# Patient Record
Sex: MALE | Race: BLACK OR AFRICAN AMERICAN | NOT HISPANIC OR LATINO | URBAN - METROPOLITAN AREA
[De-identification: names, ages, dates, MRNs, and addresses within clinical notes are randomized per-mention and may not be internally consistent; named-entity substitution may affect disease eponyms.]

---

## 2024-01-17 ENCOUNTER — INPATIENT (INPATIENT)
Facility: HOSPITAL | Age: 59
LOS: 1 days | Discharge: HOME | DRG: 321 | End: 2024-01-19
Attending: STUDENT IN AN ORGANIZED HEALTH CARE EDUCATION/TRAINING PROGRAM | Admitting: INTERNAL MEDICINE
Payer: COMMERCIAL

## 2024-01-17 VITALS
HEART RATE: 110 BPM | DIASTOLIC BLOOD PRESSURE: 102 MMHG | OXYGEN SATURATION: 97 % | HEIGHT: 64 IN | RESPIRATION RATE: 18 BRPM | SYSTOLIC BLOOD PRESSURE: 163 MMHG | TEMPERATURE: 97 F | WEIGHT: 168.43 LBS

## 2024-01-17 DIAGNOSIS — I21.3 ST ELEVATION (STEMI) MYOCARDIAL INFARCTION OF UNSPECIFIED SITE: ICD-10-CM

## 2024-01-17 LAB
ALBUMIN SERPL ELPH-MCNC: 4.7 G/DL — SIGNIFICANT CHANGE UP (ref 3.3–5)
ALP SERPL-CCNC: 77 U/L — SIGNIFICANT CHANGE UP (ref 40–120)
ALT FLD-CCNC: 90 U/L — HIGH (ref 10–45)
ANION GAP SERPL CALC-SCNC: 17 MMOL/L — SIGNIFICANT CHANGE UP (ref 5–17)
AST SERPL-CCNC: 67 U/L — HIGH (ref 10–40)
BASOPHILS # BLD AUTO: 0.05 K/UL — SIGNIFICANT CHANGE UP (ref 0–0.2)
BASOPHILS NFR BLD AUTO: 0.3 % — SIGNIFICANT CHANGE UP (ref 0–2)
BILIRUB SERPL-MCNC: 0.4 MG/DL — SIGNIFICANT CHANGE UP (ref 0.2–1.2)
BLD GP AB SCN SERPL QL: NEGATIVE — SIGNIFICANT CHANGE UP
BUN SERPL-MCNC: 18 MG/DL — SIGNIFICANT CHANGE UP (ref 7–23)
CALCIUM SERPL-MCNC: 9.4 MG/DL — SIGNIFICANT CHANGE UP (ref 8.4–10.5)
CHLORIDE SERPL-SCNC: 97 MMOL/L — SIGNIFICANT CHANGE UP (ref 96–108)
CK MB BLD-MCNC: 5.7 % — HIGH (ref 0–3.5)
CK MB CFR SERPL CALC: 13.3 NG/ML — HIGH (ref 0–6.7)
CK SERPL-CCNC: 235 U/L — HIGH (ref 30–200)
CO2 SERPL-SCNC: 25 MMOL/L — SIGNIFICANT CHANGE UP (ref 22–31)
CREAT SERPL-MCNC: 1.04 MG/DL — SIGNIFICANT CHANGE UP (ref 0.5–1.3)
EGFR: 84 ML/MIN/1.73M2 — SIGNIFICANT CHANGE UP
EOSINOPHIL # BLD AUTO: 0.01 K/UL — SIGNIFICANT CHANGE UP (ref 0–0.5)
EOSINOPHIL NFR BLD AUTO: 0.1 % — SIGNIFICANT CHANGE UP (ref 0–6)
GLUCOSE SERPL-MCNC: 142 MG/DL — HIGH (ref 70–99)
HCT VFR BLD CALC: 39.9 % — SIGNIFICANT CHANGE UP (ref 39–50)
HGB BLD-MCNC: 13.7 G/DL — SIGNIFICANT CHANGE UP (ref 13–17)
IMM GRANULOCYTES NFR BLD AUTO: 0.6 % — SIGNIFICANT CHANGE UP (ref 0–0.9)
LYMPHOCYTES # BLD AUTO: 1.39 K/UL — SIGNIFICANT CHANGE UP (ref 1–3.3)
LYMPHOCYTES # BLD AUTO: 7 % — LOW (ref 13–44)
MAGNESIUM SERPL-MCNC: 1.8 MG/DL — SIGNIFICANT CHANGE UP (ref 1.6–2.6)
MCHC RBC-ENTMCNC: 27.7 PG — SIGNIFICANT CHANGE UP (ref 27–34)
MCHC RBC-ENTMCNC: 34.3 GM/DL — SIGNIFICANT CHANGE UP (ref 32–36)
MCV RBC AUTO: 80.8 FL — SIGNIFICANT CHANGE UP (ref 80–100)
MONOCYTES # BLD AUTO: 0.6 K/UL — SIGNIFICANT CHANGE UP (ref 0–0.9)
MONOCYTES NFR BLD AUTO: 3 % — SIGNIFICANT CHANGE UP (ref 2–14)
NEUTROPHILS # BLD AUTO: 17.57 K/UL — HIGH (ref 1.8–7.4)
NEUTROPHILS NFR BLD AUTO: 89 % — HIGH (ref 43–77)
NRBC # BLD: 0 /100 WBCS — SIGNIFICANT CHANGE UP (ref 0–0)
PHOSPHATE SERPL-MCNC: 2.8 MG/DL — SIGNIFICANT CHANGE UP (ref 2.5–4.5)
PLATELET # BLD AUTO: 233 K/UL — SIGNIFICANT CHANGE UP (ref 150–400)
POTASSIUM SERPL-MCNC: 3.8 MMOL/L — SIGNIFICANT CHANGE UP (ref 3.5–5.3)
POTASSIUM SERPL-SCNC: 3.8 MMOL/L — SIGNIFICANT CHANGE UP (ref 3.5–5.3)
PROT SERPL-MCNC: 8.8 G/DL — HIGH (ref 6–8.3)
RBC # BLD: 4.94 M/UL — SIGNIFICANT CHANGE UP (ref 4.2–5.8)
RBC # FLD: 13.9 % — SIGNIFICANT CHANGE UP (ref 10.3–14.5)
RH IG SCN BLD-IMP: POSITIVE — SIGNIFICANT CHANGE UP
SODIUM SERPL-SCNC: 139 MMOL/L — SIGNIFICANT CHANGE UP (ref 135–145)
TROPONIN T, HIGH SENSITIVITY RESULT: 218 NG/L — HIGH (ref 0–51)
WBC # BLD: 19.73 K/UL — HIGH (ref 3.8–10.5)
WBC # FLD AUTO: 19.73 K/UL — HIGH (ref 3.8–10.5)

## 2024-01-17 PROCEDURE — 93458 L HRT ARTERY/VENTRICLE ANGIO: CPT | Mod: 26,59

## 2024-01-17 PROCEDURE — 99291 CRITICAL CARE FIRST HOUR: CPT

## 2024-01-17 PROCEDURE — 92941 PRQ TRLML REVSC TOT OCCL AMI: CPT | Mod: RC

## 2024-01-17 PROCEDURE — 93010 ELECTROCARDIOGRAM REPORT: CPT

## 2024-01-17 RX ORDER — MAGNESIUM SULFATE 500 MG/ML
2 VIAL (ML) INJECTION ONCE
Refills: 0 | Status: COMPLETED | OUTPATIENT
Start: 2024-01-17 | End: 2024-01-17

## 2024-01-17 RX ORDER — POTASSIUM CHLORIDE 20 MEQ
20 PACKET (EA) ORAL ONCE
Refills: 0 | Status: COMPLETED | OUTPATIENT
Start: 2024-01-17 | End: 2024-01-17

## 2024-01-17 RX ORDER — METOPROLOL TARTRATE 50 MG
12.5 TABLET ORAL ONCE
Refills: 0 | Status: COMPLETED | OUTPATIENT
Start: 2024-01-17 | End: 2024-01-17

## 2024-01-17 RX ORDER — METOPROLOL TARTRATE 50 MG
12.5 TABLET ORAL
Refills: 0 | Status: DISCONTINUED | OUTPATIENT
Start: 2024-01-17 | End: 2024-01-17

## 2024-01-17 RX ORDER — ASPIRIN/CALCIUM CARB/MAGNESIUM 324 MG
81 TABLET ORAL DAILY
Refills: 0 | Status: DISCONTINUED | OUTPATIENT
Start: 2024-01-18 | End: 2024-01-19

## 2024-01-17 RX ORDER — ACETAMINOPHEN 500 MG
650 TABLET ORAL ONCE
Refills: 0 | Status: COMPLETED | OUTPATIENT
Start: 2024-01-17 | End: 2024-01-17

## 2024-01-17 RX ORDER — TICAGRELOR 90 MG/1
90 TABLET ORAL EVERY 12 HOURS
Refills: 0 | Status: DISCONTINUED | OUTPATIENT
Start: 2024-01-18 | End: 2024-01-19

## 2024-01-17 RX ORDER — METOPROLOL TARTRATE 50 MG
25 TABLET ORAL
Refills: 0 | Status: DISCONTINUED | OUTPATIENT
Start: 2024-01-18 | End: 2024-01-19

## 2024-01-17 RX ORDER — HYDRALAZINE HCL 50 MG
25 TABLET ORAL ONCE
Refills: 0 | Status: COMPLETED | OUTPATIENT
Start: 2024-01-17 | End: 2024-01-17

## 2024-01-17 RX ORDER — HYDRALAZINE HCL 50 MG
25 TABLET ORAL THREE TIMES A DAY
Refills: 0 | Status: DISCONTINUED | OUTPATIENT
Start: 2024-01-18 | End: 2024-01-18

## 2024-01-17 RX ORDER — TICAGRELOR 90 MG/1
180 TABLET ORAL ONCE
Refills: 0 | Status: COMPLETED | OUTPATIENT
Start: 2024-01-17 | End: 2024-01-17

## 2024-01-17 RX ORDER — ATORVASTATIN CALCIUM 80 MG/1
80 TABLET, FILM COATED ORAL AT BEDTIME
Refills: 0 | Status: DISCONTINUED | OUTPATIENT
Start: 2024-01-17 | End: 2024-01-19

## 2024-01-17 RX ADMIN — Medication 20 MILLIEQUIVALENT(S): at 22:20

## 2024-01-17 RX ADMIN — Medication 650 MILLIGRAM(S): at 20:31

## 2024-01-17 RX ADMIN — Medication 12.5 MILLIGRAM(S): at 20:31

## 2024-01-17 RX ADMIN — TICAGRELOR 180 MILLIGRAM(S): 90 TABLET ORAL at 20:31

## 2024-01-17 RX ADMIN — ATORVASTATIN CALCIUM 80 MILLIGRAM(S): 80 TABLET, FILM COATED ORAL at 21:21

## 2024-01-17 RX ADMIN — Medication 25 MILLIGRAM(S): at 23:59

## 2024-01-17 RX ADMIN — Medication 650 MILLIGRAM(S): at 20:46

## 2024-01-17 RX ADMIN — Medication 12.5 MILLIGRAM(S): at 23:29

## 2024-01-17 RX ADMIN — Medication 25 GRAM(S): at 22:21

## 2024-01-17 NOTE — H&P ADULT - HISTORY OF PRESENT ILLNESS
59 year old male with past medical history of hypothyroidism, HTN, HLD, BPH and GERD presenting to outside hospital with chest pain found to have IWSTEMI, transferred to [facility] for emergent PCI. He is now s/p [facility] with x1 GUMARO RCA. His cath course was complicated by VF requiring shock x1. On admission to CICU he is A&Ox3, and hemodynamically stable. He is stating 3/10 chest pain that is improved from symptoms prior to cath. He denies shortness of breath, abdominal pain, numbness/tingling to all extremities. 59 year old male with past medical history of hypothyroidism, HTN, HLD, BPH and GERD presenting to outside hospital with chest pain found to have IWSTEMI, transferred to [facility] for emergent PCI. He is now s/p [facility] with x1 GUMARO RCA. His cath course was complicated by VT/VF requiring shock x1. On admission to CICU he is A&Ox3, and hemodynamically stable. He is stating 3/10 chest pain that is improved from symptoms prior to cath. He denies shortness of breath, abdominal pain, numbness/tingling to all extremities.

## 2024-01-17 NOTE — H&P ADULT - NSICDXPASTMEDICALHX_GEN_ALL_CORE_FT
PAST MEDICAL HISTORY:  GERD (gastroesophageal reflux disease)     HLD (hyperlipidemia)     Hypothyroid     Mild HTN

## 2024-01-17 NOTE — H&P ADULT - ASSESSMENT
59 year old male with past medical history of hypothyroidism, HTN, HLD, BPH and gerd presenting to outside hospital with chest pain found to have IWSTEMI, transferred to [facility] for emergent PCI, now s/p [facility] with x1 GUMARO RCA.     Neuro  - A&O x3  - continue to monitor mental status    Respiratory  - saturating well on room air  - continue to monitor sp02    Cardiovascular  #IWSTEMI  - s/p C x1 GUMARO RCA  - DAPT: asa,  - high intensity statin  - GDMT: metoprolol BID  - trend enzymes to peak   - f/u echo    HTN  - antihypertensives as above    Renal  - f/u sCr  - continue strict I&O, electrolyte monitoring    GI:  - dash diet  - continue to monitor for BM    Endo  #hypothyroidism  - on synthroid at home, continue  - f/u a1c, tsh, lipid panel    Heme  - f/u cbc, no s/sx  - continue to trend daily  DVT ppx: heparin subq in am    ID  - afebrile on admission  - continue to trend wbc and fever curve    ======================= DISPOSITION  =====================  [X] Critical   [ ] Guarded    [ ] Stable    [X] Maintain in CICU  [ ] Downgrade to Telemtry  [ ] Discharge Home    Patient requires continuous monitoring with bedside rhythm monitoring, pulse ox monitoring, and intermittent blood gas analysis. Care plan discussed with ICU care team. Patient remained critical and at risk for life threatening decompensation.  Patient seen, examined and plan discussed with CCU team during rounds.     I have personally provided 75 minutes of critical care time excluding time spent on separate procedures, in addition to initial critical care time provided by the CICU Attending, Dr. Haynes/ Garrett/ Gela/ Kip/ Mulugeta/ Shankar.    Johanne Bahena, [facility] 59 year old male with past medical history of hypothyroidism, HTN, HLD, BPH and GERD presenting to outside hospital with chest pain found to have IWSTEMI, transferred to [facility] for emergent PCI, now s/p [facility] with x1 GUMARO RCA c/b by VT/VF requiring shock x1.     Neuro  - A&O x3  - continue to monitor mental status    Respiratory  - saturating well on room air  - continue to monitor sp02    Cardiovascular  # IWSTEMI  - s/p [facility] x1 GUMARO RCA  - DAPT: asa and brilinta - will load now  - high intensity statin  - GDMT: metoprolol BID  - trend enzymes to peak   - f/u echo    HTN  - antihypertensives as above    Renal  - f/u sCr  - continue strict I&O, electrolyte monitoring    GI:  - dash diet  - continue to monitor for BM    Endo  #hypothyroidism  - on synthroid at home, continue  - f/u a1c, tsh, lipid panel    Heme  - f/u cbc, no s/sx  - continue to trend daily  DVT ppx: heparin subq in am    ID  - afebrile on admission  - continue to trend wbc and fever curve    ======================= DISPOSITION  =====================  [X] Critical   [ ] Guarded    [ ] Stable    [X] Maintain in CICU  [ ] Downgrade to Telemtry  [ ] Discharge Home    Patient requires continuous monitoring with bedside rhythm monitoring, pulse ox monitoring, and intermittent blood gas analysis. Care plan discussed with ICU care team. Patient remained critical and at risk for life threatening decompensation.  Patient seen, examined and plan discussed with CCU team during rounds.     I have personally provided 75 minutes of critical care time excluding time spent on separate procedures, in addition to initial critical care time provided by the CICU Attending, Dr. Haynes/ Garrett/ Gela/ Kip/ Mulugeta/ Shankar.    Johanne Bahena, [facility]-BC

## 2024-01-17 NOTE — H&P ADULT - NSHPREVIEWOFSYSTEMS_GEN_ALL_CORE
REVIEW OF SYSTEMS:  CONSTITUTIONAL: No weakness, fevers or chills  EYES/ENT: No visual changes;  No vertigo or throat pain   NECK: No pain or stiffness  RESPIRATORY: No cough, wheezing, hemoptysis; No shortness of breath  CARDIOVASCULAR: mild chest pain. No palpitations  GASTROINTESTINAL: No abdominal or epigastric pain. No nausea, vomiting, or hematemesis; No diarrhea or constipation. No melena or hematochezia.  GENITOURINARY: No dysuria, frequency or hematuria  NEUROLOGICAL: No numbness or weakness  SKIN: No itching, rashes

## 2024-01-17 NOTE — CHART NOTE - NSCHARTNOTEFT_GEN_A_CORE
Removal of Radial Band    Pulses in the right upper extremity are palpable. The patient was placed in the supine position. The insertion site was identified and the band deflated per protocol. The radial band was removed slowly. Pressure dressing applied.     Monitoring of the right wrists and both upper extremities including neuro-vascular checks and vital signs every 15 minutes x 4.    Complications: None/Other    Comments:

## 2024-01-17 NOTE — H&P ADULT - NSHPPHYSICALEXAM_GEN_ALL_CORE
Physical Exam:   Constitutional: NAD, well-groomed, well-developed  HEENT: PERRLA, EOMI, no drainage or redness  Neck: supple,  No JVD  Respiratory: Breath Sounds equal & clear bilaterally to auscultation, no rales/rhonchi/wheezing, no accessory muscle use noted  Cardiovascular: Regular rate, regular rhythm, normal S1, S2; no murmurs or rub  Gastrointestinal: Soft, non-tender, non distended, + bowel sounds  Extremities: LANIER x 4, no peripheral edema, no cyanosis, no clubbing. +periperal pulses.   Neurological: A+O x 3; speech clear and intact; no sensory, motor  deficits, normal reflexes. Nonfocal.   Skin: warm, dry, well perfused. R radial band in place

## 2024-01-18 LAB
A1C WITH ESTIMATED AVERAGE GLUCOSE RESULT: 5.7 % — HIGH (ref 4–5.6)
ALBUMIN SERPL ELPH-MCNC: 4.2 G/DL — SIGNIFICANT CHANGE UP (ref 3.3–5)
ALP SERPL-CCNC: 67 U/L — SIGNIFICANT CHANGE UP (ref 40–120)
ALT FLD-CCNC: 79 U/L — HIGH (ref 10–45)
ANION GAP SERPL CALC-SCNC: 14 MMOL/L — SIGNIFICANT CHANGE UP (ref 5–17)
AST SERPL-CCNC: 92 U/L — HIGH (ref 10–40)
BASOPHILS # BLD AUTO: 0.04 K/UL — SIGNIFICANT CHANGE UP (ref 0–0.2)
BASOPHILS NFR BLD AUTO: 0.2 % — SIGNIFICANT CHANGE UP (ref 0–2)
BILIRUB SERPL-MCNC: 0.5 MG/DL — SIGNIFICANT CHANGE UP (ref 0.2–1.2)
BLD GP AB SCN SERPL QL: NEGATIVE — SIGNIFICANT CHANGE UP
BUN SERPL-MCNC: 17 MG/DL — SIGNIFICANT CHANGE UP (ref 7–23)
CALCIUM SERPL-MCNC: 9.3 MG/DL — SIGNIFICANT CHANGE UP (ref 8.4–10.5)
CHLORIDE SERPL-SCNC: 99 MMOL/L — SIGNIFICANT CHANGE UP (ref 96–108)
CHOLEST SERPL-MCNC: 258 MG/DL — HIGH
CK MB BLD-MCNC: 11.8 % — HIGH (ref 0–3.5)
CK MB CFR SERPL CALC: 67.3 NG/ML — HIGH (ref 0–6.7)
CK SERPL-CCNC: 568 U/L — HIGH (ref 30–200)
CO2 SERPL-SCNC: 26 MMOL/L — SIGNIFICANT CHANGE UP (ref 22–31)
CREAT SERPL-MCNC: 0.99 MG/DL — SIGNIFICANT CHANGE UP (ref 0.5–1.3)
EGFR: 89 ML/MIN/1.73M2 — SIGNIFICANT CHANGE UP
EOSINOPHIL # BLD AUTO: 0.02 K/UL — SIGNIFICANT CHANGE UP (ref 0–0.5)
EOSINOPHIL NFR BLD AUTO: 0.1 % — SIGNIFICANT CHANGE UP (ref 0–6)
ESTIMATED AVERAGE GLUCOSE: 117 MG/DL — HIGH (ref 68–114)
GLUCOSE SERPL-MCNC: 103 MG/DL — HIGH (ref 70–99)
HCT VFR BLD CALC: 39.3 % — SIGNIFICANT CHANGE UP (ref 39–50)
HDLC SERPL-MCNC: 37 MG/DL — LOW
HGB BLD-MCNC: 13.4 G/DL — SIGNIFICANT CHANGE UP (ref 13–17)
IMM GRANULOCYTES NFR BLD AUTO: 0.3 % — SIGNIFICANT CHANGE UP (ref 0–0.9)
LIPID PNL WITH DIRECT LDL SERPL: 150 MG/DL — HIGH
LYMPHOCYTES # BLD AUTO: 15.4 % — SIGNIFICANT CHANGE UP (ref 13–44)
LYMPHOCYTES # BLD AUTO: 2.52 K/UL — SIGNIFICANT CHANGE UP (ref 1–3.3)
MAGNESIUM SERPL-MCNC: 2.4 MG/DL — SIGNIFICANT CHANGE UP (ref 1.6–2.6)
MCHC RBC-ENTMCNC: 27.8 PG — SIGNIFICANT CHANGE UP (ref 27–34)
MCHC RBC-ENTMCNC: 34.1 GM/DL — SIGNIFICANT CHANGE UP (ref 32–36)
MCV RBC AUTO: 81.5 FL — SIGNIFICANT CHANGE UP (ref 80–100)
MONOCYTES # BLD AUTO: 0.96 K/UL — HIGH (ref 0–0.9)
MONOCYTES NFR BLD AUTO: 5.9 % — SIGNIFICANT CHANGE UP (ref 2–14)
NEUTROPHILS # BLD AUTO: 12.74 K/UL — HIGH (ref 1.8–7.4)
NEUTROPHILS NFR BLD AUTO: 78.1 % — HIGH (ref 43–77)
NON HDL CHOLESTEROL: 220 MG/DL — HIGH
NRBC # BLD: 0 /100 WBCS — SIGNIFICANT CHANGE UP (ref 0–0)
PHOSPHATE SERPL-MCNC: 4.5 MG/DL — SIGNIFICANT CHANGE UP (ref 2.5–4.5)
PLATELET # BLD AUTO: 257 K/UL — SIGNIFICANT CHANGE UP (ref 150–400)
POTASSIUM SERPL-MCNC: 4.3 MMOL/L — SIGNIFICANT CHANGE UP (ref 3.5–5.3)
POTASSIUM SERPL-SCNC: 4.3 MMOL/L — SIGNIFICANT CHANGE UP (ref 3.5–5.3)
PROT SERPL-MCNC: 8.1 G/DL — SIGNIFICANT CHANGE UP (ref 6–8.3)
RBC # BLD: 4.82 M/UL — SIGNIFICANT CHANGE UP (ref 4.2–5.8)
RBC # FLD: 14 % — SIGNIFICANT CHANGE UP (ref 10.3–14.5)
RH IG SCN BLD-IMP: POSITIVE — SIGNIFICANT CHANGE UP
SODIUM SERPL-SCNC: 139 MMOL/L — SIGNIFICANT CHANGE UP (ref 135–145)
TRIGL SERPL-MCNC: 378 MG/DL — HIGH
TROPONIN T, HIGH SENSITIVITY RESULT: 810 NG/L — HIGH (ref 0–51)
TSH SERPL-MCNC: 1.06 UIU/ML — SIGNIFICANT CHANGE UP (ref 0.27–4.2)
WBC # BLD: 16.33 K/UL — HIGH (ref 3.8–10.5)
WBC # FLD AUTO: 16.33 K/UL — HIGH (ref 3.8–10.5)

## 2024-01-18 PROCEDURE — 92928 PRQ TCAT PLMT NTRAC ST 1 LES: CPT | Mod: LD

## 2024-01-18 PROCEDURE — 93010 ELECTROCARDIOGRAM REPORT: CPT

## 2024-01-18 PROCEDURE — 99152 MOD SED SAME PHYS/QHP 5/>YRS: CPT

## 2024-01-18 PROCEDURE — 99291 CRITICAL CARE FIRST HOUR: CPT | Mod: GC,25

## 2024-01-18 PROCEDURE — 93306 TTE W/DOPPLER COMPLETE: CPT | Mod: 26

## 2024-01-18 RX ORDER — NITROGLYCERIN 6.5 MG
0.4 CAPSULE, EXTENDED RELEASE ORAL
Refills: 0 | Status: DISCONTINUED | OUTPATIENT
Start: 2024-01-18 | End: 2024-01-19

## 2024-01-18 RX ORDER — LOSARTAN POTASSIUM 100 MG/1
25 TABLET, FILM COATED ORAL DAILY
Refills: 0 | Status: DISCONTINUED | OUTPATIENT
Start: 2024-01-18 | End: 2024-01-19

## 2024-01-18 RX ORDER — HEPARIN SODIUM 5000 [USP'U]/ML
5000 INJECTION INTRAVENOUS; SUBCUTANEOUS EVERY 12 HOURS
Refills: 0 | Status: DISCONTINUED | OUTPATIENT
Start: 2024-01-18 | End: 2024-01-19

## 2024-01-18 RX ORDER — LEVOTHYROXINE SODIUM 125 MCG
75 TABLET ORAL DAILY
Refills: 0 | Status: DISCONTINUED | OUTPATIENT
Start: 2024-01-18 | End: 2024-01-19

## 2024-01-18 RX ORDER — ACETAMINOPHEN 500 MG
1000 TABLET ORAL ONCE
Refills: 0 | Status: COMPLETED | OUTPATIENT
Start: 2024-01-18 | End: 2024-01-18

## 2024-01-18 RX ORDER — ACETAMINOPHEN 500 MG
650 TABLET ORAL ONCE
Refills: 0 | Status: DISCONTINUED | OUTPATIENT
Start: 2024-01-18 | End: 2024-01-18

## 2024-01-18 RX ADMIN — Medication 0.4 MILLIGRAM(S): at 14:35

## 2024-01-18 RX ADMIN — Medication 0.4 MILLIGRAM(S): at 14:49

## 2024-01-18 RX ADMIN — ATORVASTATIN CALCIUM 80 MILLIGRAM(S): 80 TABLET, FILM COATED ORAL at 21:05

## 2024-01-18 RX ADMIN — TICAGRELOR 90 MILLIGRAM(S): 90 TABLET ORAL at 17:18

## 2024-01-18 RX ADMIN — HEPARIN SODIUM 5000 UNIT(S): 5000 INJECTION INTRAVENOUS; SUBCUTANEOUS at 17:18

## 2024-01-18 RX ADMIN — Medication 81 MILLIGRAM(S): at 11:17

## 2024-01-18 RX ADMIN — LOSARTAN POTASSIUM 25 MILLIGRAM(S): 100 TABLET, FILM COATED ORAL at 11:17

## 2024-01-18 RX ADMIN — Medication 1000 MILLIGRAM(S): at 15:54

## 2024-01-18 RX ADMIN — Medication 25 MILLIGRAM(S): at 05:32

## 2024-01-18 RX ADMIN — Medication 25 MILLIGRAM(S): at 17:18

## 2024-01-18 RX ADMIN — Medication 400 MILLIGRAM(S): at 15:24

## 2024-01-18 RX ADMIN — TICAGRELOR 90 MILLIGRAM(S): 90 TABLET ORAL at 05:35

## 2024-01-18 NOTE — PROGRESS NOTE ADULT - SUBJECTIVE AND OBJECTIVE BOX
Patient is a 59y old  Male who presents with a chief complaint of STEMI (2024 19:50)    HPI:  59 year old male with past medical history of hypothyroidism, HTN, HLD, BPH and GERD presenting to outside hospital with chest pain found to have IWSTEMI, transferred to [facility] for emergent PCI. He is now s/p [facility] with x1 GUMARO RCA. His cath course was complicated by VT/VF requiring shock x1. On admission to CICU he is A&Ox3, and hemodynamically stable. He is stating 3/10 chest pain that is improved from symptoms prior to cath. He denies shortness of breath, abdominal pain, numbness/tingling to all extremities. (2024 19:50)       INTERVAL HPI/OVERNIGHT EVENTS:   No overnight events   Afebrile, hemodynamically stable     Subjective:    ICU Vital Signs Last 24 Hrs  T(C): 36.3 (2024 03:00), Max: 36.3 (2024 18:30)  T(F): 97.4 (2024 03:00), Max: 97.4 (2024 18:30)  HR: 78 (2024 06:00) (70 - 113)  BP: 132/87 (2024 06:00) (130/89 - 163/102)  BP(mean): 105 (2024 06:00) (105 - 134)  ABP: --  ABP(mean): --  RR: 24 (2024 06:00) (14 - 30)  SpO2: 98% (2024 06:00) (96% - 99%)    O2 Parameters below as of 2024 06:00  Patient On (Oxygen Delivery Method): room air          I&O's Summary    2024 07:01  -  2024 07:00  --------------------------------------------------------  IN: 490 mL / OUT: 1300 mL / NET: -810 mL          Daily Height in cm: 162.56 (2024 18:30)    Daily Weight in k.7 (2024 05:00)    Adult Advanced Hemodynamics Last 24 Hrs  CVP(mm Hg): --  CVP(cm H2O): --  CO: --  CI: --  PA: --  PA(mean): --  PCWP: --  SVR: --  SVRI: --  PVR: --  PVRI: --    EKG/Telemetry Events:    MEDICATIONS  (STANDING):  aspirin enteric coated 81 milliGRAM(s) Oral daily  atorvastatin 80 milliGRAM(s) Oral at bedtime  hydrALAZINE 25 milliGRAM(s) Oral three times a day  metoprolol tartrate 25 milliGRAM(s) Oral two times a day  ticagrelor 90 milliGRAM(s) Oral every 12 hours    MEDICATIONS  (PRN):      PHYSICAL EXAM:  GENERAL:   HEAD:  Atraumatic, Normocephalic  EYES: EOMI, PERRLA, conjunctiva and sclera clear  NECK: Supple, No JVD, Normal thyroid, no enlarged nodes  NERVOUS SYSTEM:  Alert & Awake.   CHEST/LUNG: B/L good air entry; No rales, rhonchi, or wheezing  HEART: S1S2 normal, no S3, Regular rate and rhythm; No murmurs  ABDOMEN: Soft, Nontender, Nondistended; Bowel sounds present  EXTREMITIES:  2+ Peripheral Pulses, No clubbing, cyanosis, or edema  LYMPH: No lymphadenopathy noted  SKIN: No rashes or lesions    LABS:                        13.4   16.33 )-----------( 257      ( 2024 03:41 )             39.3     -    139  |  99  |  17  ----------------------------<  103<H>  4.3   |  26  |  0.99    Ca    9.3      2024 03:41  Phos  4.5     -  Mg     2.4         TPro  8.1  /  Alb  4.2  /  TBili  0.5  /  DBili  x   /  AST  92<H>  /  ALT  79<H>  /  AlkPhos  67  -18    LIVER FUNCTIONS - ( 2024 03:41 )  Alb: 4.2 g/dL / Pro: 8.1 g/dL / ALK PHOS: 67 U/L / ALT: 79 U/L / AST: 92 U/L / GGT: x             CAPILLARY BLOOD GLUCOSE          CKMB Units: 67.3 ng/mL ( @ 03:41)  Creatine Kinase, Serum: 568 U/L ( @ 03:41)  CKMB Units: 13.3 ng/mL ( @ 21:09)  Creatine Kinase, Serum: 235 U/L ( @ 21:09)    CARDIAC MARKERS ( 2024 03:41 )  x     / x     / 568 U/L / x     / 67.3 ng/mL  CARDIAC MARKERS ( 2024 21:09 )  x     / x     / 235 U/L / x     / 13.3 ng/mL      Urinalysis Basic - ( 2024 03:41 )    Color: x / Appearance: x / SG: x / pH: x  Gluc: 103 mg/dL / Ketone: x  / Bili: x / Urobili: x   Blood: x / Protein: x / Nitrite: x   Leuk Esterase: x / RBC: x / WBC x   Sq Epi: x / Non Sq Epi: x / Bacteria: x          RADIOLOGY & ADDITIONAL TESTS:  CXR:  LHC: GUMARO x 1 RCA      Care Discussed with Consultants/Other Providers [ x] YES  [ ] NO           Patient is a 59y old  Male who presents with a chief complaint of STEMI (2024 19:50)    HPI:  59 year old male with past medical history of hypothyroidism, HTN, HLD, BPH and GERD presenting to [facility] with exertional chest pain radiating to L arm found to have IWSTEMI, transferred to [facility] for emergent PCI. He is now s/p C with x1 GUMARO RCA. His cath course was complicated by VT/VF requiring shock x1. On admission to CICU he is A&Ox3, and hemodynamically stable. He is stating 3/10 chest pain that is improved from symptoms prior to cath. He denies shortness of breath, abdominal pain, numbness/tingling to all extremities. (2024 19:50)     FHx : father with CAD s/p multiple stents     INTERVAL HPI/OVERNIGHT EVENTS:   No overnight events,   Afebrile, hemodynamically stable     Subjective:    ICU Vital Signs Last 24 Hrs  T(C): 36.3 (2024 03:00), Max: 36.3 (2024 18:30)  T(F): 97.4 (2024 03:00), Max: 97.4 (2024 18:30)  HR: 78 (2024 06:00) (70 - 113)  BP: 132/87 (2024 06:00) (130/89 - 163/102)  BP(mean): 105 (2024 06:00) (105 - 134)  ABP: --  ABP(mean): --  RR: 24 (2024 06:00) (14 - 30)  SpO2: 98% (2024 06:00) (96% - 99%)    O2 Parameters below as of 2024 06:00  Patient On (Oxygen Delivery Method): room air    Several episodes of NSVT on tele         I&O's Summary    2024 07:01  -  2024 07:00  --------------------------------------------------------  IN: 490 mL / OUT: 1300 mL / NET: -810 mL          Daily Height in cm: 162.56 (2024 18:30)    Daily Weight in k.7 (2024 05:00)    Adult Advanced Hemodynamics Last 24 Hrs  CVP(mm Hg): --  CVP(cm H2O): --  CO: --  CI: --  PA: --  PA(mean): --  PCWP: --  SVR: --  SVRI: --  PVR: --  PVRI: --    EKG/Telemetry Events:    MEDICATIONS  (STANDING):  aspirin enteric coated 81 milliGRAM(s) Oral daily  atorvastatin 80 milliGRAM(s) Oral at bedtime  hydrALAZINE 25 milliGRAM(s) Oral three times a day  metoprolol tartrate 25 milliGRAM(s) Oral two times a day  ticagrelor 90 milliGRAM(s) Oral every 12 hours    MEDICATIONS  (PRN):      PHYSICAL EXAM:  GENERAL:   HEAD:  Atraumatic, Normocephalic  EYES: EOMI, PERRLA, conjunctiva and sclera clear  NECK: Supple, No JVD, Normal thyroid, no enlarged nodes  NERVOUS SYSTEM:  Alert & Awake.   CHEST/LUNG: B/L good air entry; No rales, rhonchi, or wheezing  HEART: S1S2 normal, no S3, Regular rate and rhythm; No murmurs  ABDOMEN: Soft, Nontender, Nondistended; Bowel sounds present  EXTREMITIES:  2+ Peripheral Pulses, No clubbing, cyanosis, or edema. R radial site well healed with palpable pulses.   LYMPH: No lymphadenopathy noted  SKIN: No rashes or lesions    LABS:                        13.4   16.33 )-----------( 257      ( 2024 03:41 )             39.3     -    139  |  99  |  17  ----------------------------<  103<H>  4.3   |  26  |  0.99    Ca    9.3      2024 03:41  Phos  4.5       Mg     2.4         TPro  8.1  /  Alb  4.2  /  TBili  0.5  /  DBili  x   /  AST  92<H>  /  ALT  79<H>  /  AlkPhos  67  18    LIVER FUNCTIONS - ( 2024 03:41 )  Alb: 4.2 g/dL / Pro: 8.1 g/dL / ALK PHOS: 67 U/L / ALT: 79 U/L / AST: 92 U/L / GGT: x             CAPILLARY BLOOD GLUCOSE          CKMB Units: 67.3 ng/mL ( @ 03:41)  Creatine Kinase, Serum: 568 U/L ( @ 03:41)  CKMB Units: 13.3 ng/mL ( @ 21:09)  Creatine Kinase, Serum: 235 U/L ( @ 21:09)    CARDIAC MARKERS ( 2024 03:41 )  x     / x     / 568 U/L / x     / 67.3 ng/mL  CARDIAC MARKERS ( 2024 21:09 )  x     / x     / 235 U/L / x     / 13.3 ng/mL      Urinalysis Basic - ( 2024 03:41 )    Color: x / Appearance: x / SG: x / pH: x  Gluc: 103 mg/dL / Ketone: x  / Bili: x / Urobili: x   Blood: x / Protein: x / Nitrite: x   Leuk Esterase: x / RBC: x / WBC x   Sq Epi: x / Non Sq Epi: x / Bacteria: x          RADIOLOGY & ADDITIONAL TESTS:  CXR:  LHC: GUMARO x 1 RCA      Care Discussed with Consultants/Other Providers [ x] YES  [ ] NO           Patient is a 59y old  Male who presents with a chief complaint of STEMI (2024 19:50)    HPI:  59 year old male with past medical history of hypothyroidism, HTN, HLD, BPH and GERD presenting to [facility] with exertional chest pain radiating to L arm found to have IWSTEMI, transferred to [facility] for emergent PCI. He is now s/p C with x1 GUMARO RCA. His cath course was complicated by VT/VF requiring shock x1. On admission to CICU he is A&Ox3, and hemodynamically stable. He is stating 3/10 chest pain that is improved from symptoms prior to cath. He denies shortness of breath, abdominal pain, numbness/tingling to all extremities. (2024 19:50)  Case discussed with patient's outpatient PMD Dr. Sethi. Patient has established care outpatient with [facility], most recent echo prior to admission 2022 showing preserved LVSF EF 69% without RMWA. Had been advised to start statin therapy outpatient however deferred at the time.   [facility] Amlodipine 10mg qd, HCTZ 25mg qd, Losartan 100mg qd, Synthroid 75mg qd, Pantoprazole 40mg qd   FHx : father with CAD s/p multiple stents     INTERVAL HPI/OVERNIGHT EVENTS:   No overnight events,   Afebrile, hemodynamically stable     Subjective: Endorsing mild 3/10 CP overnight relieved with Tylenol. CP post cath 5/10 relieved with SLGN and Tylenol.      ICU Vital Signs Last 24 Hrs  T(C): 36.3 (2024 03:00), Max: 36.3 (2024 18:30)  T(F): 97.4 (2024 03:00), Max: 97.4 (2024 18:30)  HR: 78 (2024 06:00) (70 - 113)  BP: 132/87 (2024 06:00) (130/89 - 163/102)  BP(mean): 105 (2024 06:00) (105 - 134)  ABP: --  ABP(mean): --  RR: 24 (2024 06:00) (14 - 30)  SpO2: 98% (2024 06:00) (96% - 99%)    O2 Parameters below as of 2024 06:00  Patient On (Oxygen Delivery Method): room air    Several episodes of NSVT on tele         I&O's Summary    2024 07:01  -  2024 07:00  --------------------------------------------------------  IN: 490 mL / OUT: 1300 mL / NET: -810 mL          Daily Height in cm: 162.56 (2024 18:30)    Daily Weight in k.7 (2024 05:00)    Adult Advanced Hemodynamics Last 24 Hrs  CVP(mm Hg): --  CVP(cm H2O): --  CO: --  CI: --  PA: --  PA(mean): --  PCWP: --  SVR: --  SVRI: --  PVR: --  PVRI: --    EKG/Telemetry Events:    MEDICATIONS  (STANDING):  aspirin enteric coated 81 milliGRAM(s) Oral daily  atorvastatin 80 milliGRAM(s) Oral at bedtime  hydrALAZINE 25 milliGRAM(s) Oral three times a day  metoprolol tartrate 25 milliGRAM(s) Oral two times a day  ticagrelor 90 milliGRAM(s) Oral every 12 hours    MEDICATIONS  (PRN):      PHYSICAL EXAM:  GENERAL:   HEAD:  Atraumatic, Normocephalic  EYES: EOMI, PERRLA, conjunctiva and sclera clear  NECK: Supple, No JVD, Normal thyroid, no enlarged nodes  NERVOUS SYSTEM:  Alert & Awake.   CHEST/LUNG: B/L good air entry; No rales, rhonchi, or wheezing  HEART: S1S2 normal, no S3, Regular rate and rhythm; No murmurs  ABDOMEN: Soft, Nontender, Nondistended; Bowel sounds present  EXTREMITIES:  2+ Peripheral Pulses, No clubbing, cyanosis, or edema. R radial site well healed with palpable pulses.   LYMPH: No lymphadenopathy noted  SKIN: No rashes or lesions    LABS:                        13.4   16.33 )-----------( 257      ( 2024 03:41 )             39.3     -18    139  |  99  |  17  ----------------------------<  103<H>  4.3   |  26  |  0.99    Ca    9.3      2024 03:41  Phos  4.5       Mg     2.4         TPro  8.1  /  Alb  4.2  /  TBili  0.5  /  DBili  x   /  AST  92<H>  /  ALT  79<H>  /  AlkPhos  67      LIVER FUNCTIONS - ( 2024 03:41 )  Alb: 4.2 g/dL / Pro: 8.1 g/dL / ALK PHOS: 67 U/L / ALT: 79 U/L / AST: 92 U/L / GGT: x             CAPILLARY BLOOD GLUCOSE          CKMB Units: 67.3 ng/mL ( @ 03:41)  Creatine Kinase, Serum: 568 U/L ( @ 03:41)  CKMB Units: 13.3 ng/mL ( @ 21:09)  Creatine Kinase, Serum: 235 U/L ( @ 21:09)    CARDIAC MARKERS ( 2024 03:41 )  x     / x     / 568 U/L / x     / 67.3 ng/mL  CARDIAC MARKERS ( 2024 21:09 )  x     / x     / 235 U/L / x     / 13.3 ng/mL      Urinalysis Basic - ( 2024 03:41 )    Color: x / Appearance: x / SG: x / pH: x  Gluc: 103 mg/dL / Ketone: x  / Bili: x / Urobili: x   Blood: x / Protein: x / Nitrite: x   Leuk Esterase: x / RBC: x / WBC x   Sq Epi: x / Non Sq Epi: x / Bacteria: x          RADIOLOGY & ADDITIONAL TESTS:  CXR:  EKG: Stephanie inferior leads   C : GUMARO x 1 RCA. 100% occlusion RCA, 70% occlusion LAD   staged PCI : with GUMARO to pLAD       Care Discussed with Consultants/Other Providers [ x] YES  [ ] NO

## 2024-01-18 NOTE — PROVIDER CONTACT NOTE (OTHER) - ASSESSMENT
Patient AxOx4, afebrile VSS, s/p right radial cath x1 stent RCA. Patient scheduled for C later today. Denies CP.

## 2024-01-18 NOTE — PROGRESS NOTE ADULT - SUBJECTIVE AND OBJECTIVE BOX
MIKEY PEREZ  MRN-[id number]  Patient is a 59y old  Male who presents with a chief complaint of STEMI (18 Jan 2024 07:54)    HPI:  59 year old male with past medical history of hypothyroidism, HTN, HLD, BPH and GERD presenting to outside hospital with chest pain found to have IWSTEMI, transferred to [facility] for emergent PCI. He is now s/p C with x1 GUMARO RCA. His cath course was complicated by VT/VF requiring shock x1. On admission to CICU he is A&Ox3, and hemodynamically stable. He is stating 3/10 chest pain that is improved from symptoms prior to cath. He denies shortness of breath, abdominal pain, numbness/tingling to all extremities. (17 Jan 2024 19:50)      Hospital Course:    24 HOUR EVENTS:    REVIEW OF SYSTEMS:    CONSTITUTIONAL: No weakness, fevers or chills  EYES/ENT: No visual changes;  No vertigo or throat pain   NECK: No pain or stiffness  RESPIRATORY: No cough, wheezing, hemoptysis; No shortness of breath  CARDIOVASCULAR: No chest pain or palpitations  GASTROINTESTINAL: No abdominal or epigastric pain. No nausea, vomiting, or hematemesis; No diarrhea or constipation. No melena or hematochezia.  GENITOURINARY: No dysuria, frequency or hematuria  NEUROLOGICAL: No numbness or weakness  SKIN: No itching, rashes      ICU Vital Signs Last 24 Hrs  T(C): 36.7 (18 Jan 2024 19:00), Max: 36.7 (18 Jan 2024 11:00)  T(F): 98 (18 Jan 2024 19:00), Max: 98.1 (18 Jan 2024 11:00)  HR: 68 (18 Jan 2024 20:00) (68 - 106)  BP: 129/68 (18 Jan 2024 20:00) (105/63 - 158/88)  BP(mean): 93 (18 Jan 2024 20:00) (78 - 118)  ABP: --  ABP(mean): --  RR: 21 (18 Jan 2024 20:00) (14 - 30)  SpO2: 100% (18 Jan 2024 20:00) (94% - 100%)    O2 Parameters below as of 18 Jan 2024 20:00  Patient On (Oxygen Delivery Method): room air            CVP(mm Hg): --  CO: --  CI: --  PA: --  PA(mean): --  PA(direct): --  PCWP: --  LA: --  RA: --  SVR: --  SVRI: --  PVR: --  PVRI: --  I&O's Summary    17 Jan 2024 07:01  -  18 Jan 2024 07:00  --------------------------------------------------------  IN: 490 mL / OUT: 1300 mL / NET: -810 mL    18 Jan 2024 07:01  -  18 Jan 2024 20:35  --------------------------------------------------------  IN: 250 mL / OUT: 1100 mL / NET: -850 mL        CAPILLARY BLOOD GLUCOSE    CAPILLARY BLOOD GLUCOSE          PHYSICAL EXAM:  GENERAL: No acute distress, well-developed  HEAD:  Atraumatic, Normocephalic  EYES: EOMI, PERRLA, conjunctiva and sclera clear  NECK: Supple, no lymphadenopathy, no JVD  CHEST/LUNG: CTAB; No wheezes, rales, or rhonchi  HEART: Regular rate and rhythm. Normal S1/S2. No murmurs, rubs, or gallops  ABDOMEN: Soft, non-tender, non-distended; normal bowel sounds, no organomegaly  EXTREMITIES:  2+ peripheral pulses b/l, No clubbing, cyanosis, or edema  NEUROLOGY: A&O x 3, no focal deficits  SKIN: No rashes or lesions    ============================I/O===========================   I&O's Detail    17 Jan 2024 07:01  -  18 Jan 2024 07:00  --------------------------------------------------------  IN:    Oral Fluid: 490 mL  Total IN: 490 mL    OUT:    Voided (mL): 1300 mL  Total OUT: 1300 mL    Total NET: -810 mL      18 Jan 2024 07:01  -  18 Jan 2024 20:35  --------------------------------------------------------  IN:    IV PiggyBack: 100 mL    Oral Fluid: 150 mL  Total IN: 250 mL    OUT:    Voided (mL): 1100 mL  Total OUT: 1100 mL    Total NET: -850 mL        ============================ LABS =========================                        13.4   16.33 )-----------( 257      ( 18 Jan 2024 03:41 )             39.3     01-18    139  |  99  |  17  ----------------------------<  103<H>  4.3   |  26  |  0.99    Ca    9.3      18 Jan 2024 03:41  Phos  4.5     01-18  Mg     2.4     01-18    TPro  8.1  /  Alb  4.2  /  TBili  0.5  /  DBili  x   /  AST  92<H>  /  ALT  79<H>  /  AlkPhos  67  01-18    Troponin T, High Sensitivity Result: 810 ng/L (01-18-24 @ 03:41)  Troponin T, High Sensitivity Result: 218 ng/L (01-17-24 @ 21:09)    CKMB Units: 67.3 ng/mL (01-18-24 @ 03:41)  CKMB Units: 13.3 ng/mL (01-17-24 @ 21:09)    Creatine Kinase, Serum: 568 U/L (01-18-24 @ 03:41)  Creatine Kinase, Serum: 235 U/L (01-17-24 @ 21:09)    CPK Mass Assay %: 11.8 % (01-18-24 @ 03:41)  CPK Mass Assay %: 5.7 % (01-17-24 @ 21:09)        LIVER FUNCTIONS - ( 18 Jan 2024 03:41 )  Alb: 4.2 g/dL / Pro: 8.1 g/dL / ALK PHOS: 67 U/L / ALT: 79 U/L / AST: 92 U/L / GGT: x                 Urinalysis Basic - ( 18 Jan 2024 03:41 )    Color: x / Appearance: x / SG: x / pH: x  Gluc: 103 mg/dL / Ketone: x  / Bili: x / Urobili: x   Blood: x / Protein: x / Nitrite: x   Leuk Esterase: x / RBC: x / WBC x   Sq Epi: x / Non Sq Epi: x / Bacteria: x      ======================Micro/Rad/Cardio=================  Telemtry: Reviewed   EKG: Reviewed  CXR: Reviewed  Culture: Reviewed   Echo:   Cath:   ======================================================  PAST MEDICAL & SURGICAL HISTORY:  Mild HTN      GERD (gastroesophageal reflux disease)      HLD (hyperlipidemia)      Hypothyroid  ====================ASSESSMENT ==============  59 year old male with past medical history of hypothyroidism, HTN, HLD, BPH and GERD presenting to outside hospital with chest pain found to have IWSTEMI, transferred to [facility] for emergent PCI, now s/p LHC with x1 GUMARO RCA c/b by VT/VF requiring shock x1 and staged PCI with GUMARO x 1 pLAD.     Neuro  - A&O x3  - continue to monitor mental status    Respiratory  - saturating well on room air  - continue to monitor sp02    Cardiovascular  # IWSTEMI  - s/p LHC x1 GUMARO RCA  - PCI to pLAD   - DAPT: c/w asa and brilinta   - high intensity statin  - GDMT: metoprolol BID, Losartan 25   - trend enzymes to peak   - f/u echo    HTN  - antihypertensives as above    Renal  - f/u sCr  - continue strict I&O, electrolyte monitoring    GI:  - dash diet  - continue to monitor for BM    Endo  #hypothyroidism  - on synthroid at home, continue (will clarify dose in AM)   - A1C 5.7  #HLD  - Cholesterol 258, , HDL 37,   - c/w Lipitor 80     Heme  - f/u cbc, no s/sx  - continue to trend daily  DVT ppx: heparin subq     ID  - afebrile on admission  - continue to trend wbc and fever curve  - leukocytosis likely reactive, downtrending     ======================= DISPOSITION  =====================  [X] Critical   [ ] Guarded    [ ] Stable    [X] Maintain in CICU  [ ] Downgrade to Telemtry  [ ] Discharge Home      Patient requires continuous monitoring with bedside rhythm monitoring, pulse ox monitoring, and intermittent blood gas analysis. Care plan discussed with ICU care team. Patient remained critical and at risk for life threatening decompensation.  Patient seen, examined and plan discussed with CCU team during rounds.     I have personally provided ____ minutes of critical care time excluding time spent on separate procedures, in addition to initial critical care time provided by the CICU Attending, Dr. Chaparro    By signing my name below, I, Rae Chery, attest that this documentation has been prepared under the direction and in the presence of ANTHONY Encinas.   Electronically signed: Fermin Gregory, 01-18-24 @ 20:35    I, Rajani Smith , personally performed the services described in this documentation. all medical record entries made by the fermin were at my direction and in my presence. I have reviewed the chart and agree that the record reflects my personal performance and is accurate and complete  Electronically signed: ANTHONY Encinas.        MIKEY PEREZ  MRN-[id number]  Patient is a 59y old  Male who presents with a chief complaint of STEMI (18 Jan 2024 07:54)    HPI:  59 year old male with past medical history of hypothyroidism, HTN, HLD, BPH and GERD presenting to outside hospital with chest pain found to have IWSTEMI, transferred to [facility] for emergent PCI. He is now s/p [facility] with x1 GUMARO RCA. His cath course was complicated by VT/VF requiring shock x1. On admission to CICU he is A&Ox3, and hemodynamically stable. He is stating 3/10 chest pain that is improved from symptoms prior to cath. He denies shortness of breath, abdominal pain, numbness/tingling to all extremities. (17 Jan 2024 19:50)      24 HOUR EVENTS:  - Staged PCI today, GUMARO x1 to pLAD  - TTE: EF 39%, LV inferior wall hypokinesis    REVIEW OF SYSTEMS:  CONSTITUTIONAL: No weakness, fevers or chills  EYES/ENT: No visual changes;  No vertigo or throat pain   NECK: No pain or stiffness  RESPIRATORY: No cough, wheezing, hemoptysis; No shortness of breath  CARDIOVASCULAR: No chest pain or palpitations  GASTROINTESTINAL: No abdominal or epigastric pain. No nausea, vomiting, or hematemesis; No diarrhea or constipation. No melena or hematochezia.  GENITOURINARY: No dysuria, frequency or hematuria  NEUROLOGICAL: No numbness or weakness  SKIN: No itching, rashes      ICU Vital Signs Last 24 Hrs  T(C): 36.7 (18 Jan 2024 19:00), Max: 36.7 (18 Jan 2024 11:00)  T(F): 98 (18 Jan 2024 19:00), Max: 98.1 (18 Jan 2024 11:00)  HR: 68 (18 Jan 2024 20:00) (68 - 106)  BP: 129/68 (18 Jan 2024 20:00) (105/63 - 158/88)  BP(mean): 93 (18 Jan 2024 20:00) (78 - 118)  ABP: --  ABP(mean): --  RR: 21 (18 Jan 2024 20:00) (14 - 30)  SpO2: 100% (18 Jan 2024 20:00) (94% - 100%)    O2 Parameters below as of 18 Jan 2024 20:00  Patient On (Oxygen Delivery Method): room air        I&O's Summary    17 Jan 2024 07:01  -  18 Jan 2024 07:00  --------------------------------------------------------  IN: 490 mL / OUT: 1300 mL / NET: -810 mL    18 Jan 2024 07:01  -  18 Jan 2024 20:35  --------------------------------------------------------  IN: 250 mL / OUT: 1100 mL / NET: -850 mL        CAPILLARY BLOOD GLUCOSE    CAPILLARY BLOOD GLUCOSE          PHYSICAL EXAM:  GENERAL: No acute distress, well-developed  HEAD:  Atraumatic, Normocephalic  EYES: EOMI, PERRLA, conjunctiva and sclera clear  NECK: Supple, no lymphadenopathy, no JVD  CHEST/LUNG: CTAB; No wheezes, rales, or rhonchi  HEART: Regular rate and rhythm. Normal S1/S2. No murmurs, rubs, or gallops  ABDOMEN: Soft, non-tender, non-distended; normal bowel sounds, no organomegaly  EXTREMITIES:  2+ peripheral pulses b/l, No clubbing, cyanosis, or edema. R Radial band site soft, no evidence of hematoma  NEUROLOGY: A&O x 3, no focal deficits  SKIN: No rashes or lesions    ============================I/O===========================   I&O's Detail    17 Jan 2024 07:01  -  18 Jan 2024 07:00  --------------------------------------------------------  IN:    Oral Fluid: 490 mL  Total IN: 490 mL    OUT:    Voided (mL): 1300 mL  Total OUT: 1300 mL    Total NET: -810 mL      18 Jan 2024 07:01  -  18 Jan 2024 20:35  --------------------------------------------------------  IN:    IV PiggyBack: 100 mL    Oral Fluid: 150 mL  Total IN: 250 mL    OUT:    Voided (mL): 1100 mL  Total OUT: 1100 mL    Total NET: -850 mL        ============================ LABS =========================                        13.4   16.33 )-----------( 257      ( 18 Jan 2024 03:41 )             39.3     01-18    139  |  99  |  17  ----------------------------<  103<H>  4.3   |  26  |  0.99    Ca    9.3      18 Jan 2024 03:41  Phos  4.5     01-18  Mg     2.4     01-18    TPro  8.1  /  Alb  4.2  /  TBili  0.5  /  DBili  x   /  AST  92<H>  /  ALT  79<H>  /  AlkPhos  67  01-18    Troponin T, High Sensitivity Result: 810 ng/L (01-18-24 @ 03:41)  Troponin T, High Sensitivity Result: 218 ng/L (01-17-24 @ 21:09)    CKMB Units: 67.3 ng/mL (01-18-24 @ 03:41)  CKMB Units: 13.3 ng/mL (01-17-24 @ 21:09)    Creatine Kinase, Serum: 568 U/L (01-18-24 @ 03:41)  Creatine Kinase, Serum: 235 U/L (01-17-24 @ 21:09)    CPK Mass Assay %: 11.8 % (01-18-24 @ 03:41)  CPK Mass Assay %: 5.7 % (01-17-24 @ 21:09)        LIVER FUNCTIONS - ( 18 Jan 2024 03:41 )  Alb: 4.2 g/dL / Pro: 8.1 g/dL / ALK PHOS: 67 U/L / ALT: 79 U/L / AST: 92 U/L / GGT: x                 Urinalysis Basic - ( 18 Jan 2024 03:41 )    Color: x / Appearance: x / SG: x / pH: x  Gluc: 103 mg/dL / Ketone: x  / Bili: x / Urobili: x   Blood: x / Protein: x / Nitrite: x   Leuk Esterase: x / RBC: x / WBC x   Sq Epi: x / Non Sq Epi: x / Bacteria: x      ======================Micro/Rad/Cardio=================  Telemtry: Reviewed   EKG: Reviewed  CXR: Reviewed  Culture: Reviewed   Echo:   Cath:   ======================================================  PAST MEDICAL & SURGICAL HISTORY:  Mild HTN      GERD (gastroesophageal reflux disease)      HLD (hyperlipidemia)      Hypothyroid  ====================ASSESSMENT ==============  59 year old male with past medical history of hypothyroidism, HTN, HLD, BPH and GERD presenting to outside hospital with chest pain found to have IWSTEMI, transferred to [facility] for emergent PCI, now s/p LHC with x1 GUMARO RCA c/b by VT/VF requiring shock x1 and staged PCI with GUMARO x 1 pLAD.     Neuro  - A&O x3  - continue to monitor mental status    Respiratory  - saturating well on room air  - continue to monitor sp02    Cardiovascular  # IWSTEMI  - s/p LHC x1 GUMARO RCA  - PCI to pLAD x1 GUMARO today  - DAPT: c/w asa and brilinta   - high intensity statin  - GDMT: metoprolol BID, Losartan 25   - CE peaked  - TTE 1/18: EF 39%, inferior wall hypokinesis    HTN  - antihypertensives as above    Renal  - f/u sCr  - continue strict I&O, electrolyte monitoring    GI:  - dash diet  - continue to monitor for BM    Endo  #hypothyroidism  - on synthroid at home, continue (will clarify dose in AM)   - A1C 5.7  #HLD  - Cholesterol 258, , HDL 37,   - c/w Lipitor 80     Heme  - f/u cbc, no s/sx  - continue to trend daily  DVT ppx: heparin subq     ID  - afebrile on admission  - continue to trend wbc and fever curve  - leukocytosis likely reactive, downtrending     ======================= DISPOSITION  =====================  [X] Critical   [ ] Guarded    [ ] Stable    [X] Maintain in CICU  [ ] Downgrade to Telemtry  [ ] Discharge Home      Patient requires continuous monitoring with bedside rhythm monitoring, pulse ox monitoring, and intermittent blood gas analysis. Care plan discussed with ICU care team. Patient remained critical and at risk for life threatening decompensation.  Patient seen, examined and plan discussed with CCU team during rounds.     I have personally provided 35 minutes of critical care time excluding time spent on separate procedures, in addition to initial critical care time provided by the CICU Attending, Dr. Chaparro    By signing my name below, I, Rae Chery, attest that this documentation has been prepared under the direction and in the presence of ANTHONY Encinas.   Electronically signed: Laurence Gregory, 01-18-24 @ 20:35    I, Rajani Smith , personally performed the services described in this documentation. all medical record entries made by the adriánibe were at my direction and in my presence. I have reviewed the chart and agree that the record reflects my personal performance and is accurate and complete  Electronically signed: ANTHONY Encinas.

## 2024-01-18 NOTE — CHART NOTE - NSCHARTNOTEFT_GEN_A_CORE
Removal of Radial Band    Pulses in the right upper extremity are palpable. The patient was placed in the supine position. The insertion site was identified and the band deflated per protocol. The radial band was removed slowly. Direct pressure was not required as hemostasis was already achieved.    Monitoring of the right wrists and both upper extremities including neuro-vascular checks and vital signs every 15 minutes x 4.    Complications: None/Other

## 2024-01-18 NOTE — PROGRESS NOTE ADULT - ASSESSMENT
59 year old male with past medical history of hypothyroidism, HTN, HLD, BPH and GERD presenting to outside hospital with chest pain found to have IWSTEMI, transferred to [facility] for emergent PCI, now s/p LHC with x1 GUMARO RCA c/b by VT/VF requiring shock x1.     Neuro  - A&O x3  - continue to monitor mental status    Respiratory  - saturating well on room air  - continue to monitor sp02    Cardiovascular  # IWSTEMI  - s/p LHC x1 GUMARO RCA  - DAPT: c/w asa and brilinta   - high intensity statin  - GDMT: metoprolol BID  - trend enzymes to peak   - f/u echo    HTN  - antihypertensives as above    Renal  - f/u sCr  - continue strict I&O, electrolyte monitoring    GI:  - dash diet  - continue to monitor for BM    Endo  #hypothyroidism  - on synthroid at home, continue  - A1C 5.7  #HLD  - Cholesterol 258, , HDL 37,   - c/w Lipitor 80     Heme  - f/u cbc, no s/sx  - continue to trend daily  DVT ppx: heparin subq     ID  - afebrile on admission  - continue to trend wbc and fever curve  - leukocytosis likely reactive, downtrending     ======================= DISPOSITION  =====================  [X] Critical   [ ] Guarded    [ ] Stable    [X] Maintain in CICU  [ ] Downgrade to Telemtry  [ ] Discharge Home    Patient requires continuous monitoring with bedside rhythm monitoring, pulse ox monitoring, and intermittent blood gas analysis. Care plan discussed with ICU care team. Patient remained critical and at risk for life threatening decompensation.  Patient seen, examined and plan discussed with CCU team during rounds.     I have personally provided 75 minutes of critical care time excluding time spent on separate procedures, in addition to initial critical care time provided by the CICU Attending, Dr. Haynes/ Garrett/ Gela/ Kip/ Mulugeta/ Shankar.    Johanne Bahena, [facility]-BC 59 year old male with past medical history of hypothyroidism, HTN, HLD, BPH and GERD presenting to outside hospital with chest pain found to have IWSTEMI, transferred to [facility] for emergent PCI, now s/p LHC with x1 GUMARO RCA c/b by VT/VF requiring shock x1.     Neuro  - A&O x3  - continue to monitor mental status    Respiratory  - saturating well on room air  - continue to monitor sp02    Cardiovascular  # IWSTEMI  - s/p LHC x1 GUMARO RCA  - pending staged PCI to LAD   - DAPT: c/w asa and brilinta   - high intensity statin  - GDMT: metoprolol BID, Losartan 25   - trend enzymes to peak   - f/u echo    HTN  - antihypertensives as above    Renal  - f/u sCr  - continue strict I&O, electrolyte monitoring    GI:  - dash diet  - continue to monitor for BM    Endo  #hypothyroidism  - on synthroid at home, continue (will clarify dose in AM)   - A1C 5.7  #HLD  - Cholesterol 258, , HDL 37,   - c/w Lipitor 80     Heme  - f/u cbc, no s/sx  - continue to trend daily  DVT ppx: heparin subq     ID  - afebrile on admission  - continue to trend wbc and fever curve  - leukocytosis likely reactive, downtrending     ======================= DISPOSITION  =====================  [X] Critical   [ ] Guarded    [ ] Stable    [X] Maintain in CICU  [ ] Downgrade to Telemtry  [ ] Discharge Home    Patient requires continuous monitoring with bedside rhythm monitoring, pulse ox monitoring, and intermittent blood gas analysis. Care plan discussed with ICU care team. Patient remained critical and at risk for life threatening decompensation.  Patient seen, examined and plan discussed with CCU team during rounds.     I have personally provided 75 minutes of critical care time excluding time spent on separate procedures, in addition to initial critical care time provided by the CICU Attending, Dr. Haynes/ Garrett/ Gela/ Kip/ Mulugeta/ Shankar.    Johanne Bahena, [facility]-BC 59 year old male with past medical history of hypothyroidism, HTN, HLD, BPH and GERD presenting to outside hospital with chest pain found to have IWSTEMI, transferred to [facility] for emergent PCI, now s/p LHC with x1 GUMARO RCA c/b by VT/VF requiring shock x1 and staged PCI with GUMARO x 1 pLAD.     Neuro  - A&O x3  - continue to monitor mental status    Respiratory  - saturating well on room air  - continue to monitor sp02    Cardiovascular  # IWSTEMI  - s/p LHC x1 GUMARO RCA  - PCI to pLAD   - DAPT: c/w asa and brilinta   - high intensity statin  - GDMT: metoprolol BID, Losartan 25   - trend enzymes to peak   - f/u echo    HTN  - antihypertensives as above    Renal  - f/u sCr  - continue strict I&O, electrolyte monitoring    GI:  - dash diet  - continue to monitor for BM    Endo  #hypothyroidism  - on synthroid at home, continue (will clarify dose in AM)   - A1C 5.7  #HLD  - Cholesterol 258, , HDL 37,   - c/w Lipitor 80     Heme  - f/u cbc, no s/sx  - continue to trend daily  DVT ppx: heparin subq     ID  - afebrile on admission  - continue to trend wbc and fever curve  - leukocytosis likely reactive, downtrending     ======================= DISPOSITION  =====================  [X] Critical   [ ] Guarded    [ ] Stable    [X] Maintain in CICU  [ ] Downgrade to Telemtry  [ ] Discharge Home

## 2024-01-18 NOTE — PROVIDER CONTACT NOTE (OTHER) - NAME OF MD/NP/PA/DO NOTIFIED:
PA Roman Mesh Principal Discharge DX:	UTI (urinary tract infection)  Secondary Diagnosis:	Hyponatremia

## 2024-01-19 ENCOUNTER — NON-APPOINTMENT (OUTPATIENT)
Age: 59
End: 2024-01-19

## 2024-01-19 ENCOUNTER — TRANSCRIPTION ENCOUNTER (OUTPATIENT)
Age: 59
End: 2024-01-19

## 2024-01-19 VITALS
OXYGEN SATURATION: 98 % | DIASTOLIC BLOOD PRESSURE: 60 MMHG | SYSTOLIC BLOOD PRESSURE: 105 MMHG | RESPIRATION RATE: 21 BRPM

## 2024-01-19 DIAGNOSIS — I25.112 ATHEROSCLEROTIC HEART DISEASE OF NATIVE CORONARY ARTERY WITH REFRACTORY ANGINA PECTORIS: ICD-10-CM

## 2024-01-19 LAB
ALBUMIN SERPL ELPH-MCNC: 3.9 G/DL — SIGNIFICANT CHANGE UP (ref 3.3–5)
ALP SERPL-CCNC: 64 U/L — SIGNIFICANT CHANGE UP (ref 40–120)
ALT FLD-CCNC: 73 U/L — HIGH (ref 10–45)
ANION GAP SERPL CALC-SCNC: 10 MMOL/L — SIGNIFICANT CHANGE UP (ref 5–17)
AST SERPL-CCNC: 91 U/L — HIGH (ref 10–40)
BASOPHILS # BLD AUTO: 0.04 K/UL — SIGNIFICANT CHANGE UP (ref 0–0.2)
BASOPHILS NFR BLD AUTO: 0.3 % — SIGNIFICANT CHANGE UP (ref 0–2)
BILIRUB SERPL-MCNC: 0.4 MG/DL — SIGNIFICANT CHANGE UP (ref 0.2–1.2)
BUN SERPL-MCNC: 23 MG/DL — SIGNIFICANT CHANGE UP (ref 7–23)
CALCIUM SERPL-MCNC: 9.2 MG/DL — SIGNIFICANT CHANGE UP (ref 8.4–10.5)
CHLORIDE SERPL-SCNC: 102 MMOL/L — SIGNIFICANT CHANGE UP (ref 96–108)
CK MB BLD-MCNC: 7.5 % — HIGH (ref 0–3.5)
CK MB CFR SERPL CALC: 38.4 NG/ML — HIGH (ref 0–6.7)
CK SERPL-CCNC: 515 U/L — HIGH (ref 30–200)
CO2 SERPL-SCNC: 26 MMOL/L — SIGNIFICANT CHANGE UP (ref 22–31)
CREAT SERPL-MCNC: 1.11 MG/DL — SIGNIFICANT CHANGE UP (ref 0.5–1.3)
EGFR: 77 ML/MIN/1.73M2 — SIGNIFICANT CHANGE UP
EOSINOPHIL # BLD AUTO: 0.13 K/UL — SIGNIFICANT CHANGE UP (ref 0–0.5)
EOSINOPHIL NFR BLD AUTO: 1.1 % — SIGNIFICANT CHANGE UP (ref 0–6)
GLUCOSE SERPL-MCNC: 108 MG/DL — HIGH (ref 70–99)
HCT VFR BLD CALC: 37.6 % — LOW (ref 39–50)
HGB BLD-MCNC: 12.8 G/DL — LOW (ref 13–17)
IMM GRANULOCYTES NFR BLD AUTO: 0.4 % — SIGNIFICANT CHANGE UP (ref 0–0.9)
LYMPHOCYTES # BLD AUTO: 25.3 % — SIGNIFICANT CHANGE UP (ref 13–44)
LYMPHOCYTES # BLD AUTO: 3.13 K/UL — SIGNIFICANT CHANGE UP (ref 1–3.3)
MAGNESIUM SERPL-MCNC: 2.2 MG/DL — SIGNIFICANT CHANGE UP (ref 1.6–2.6)
MCHC RBC-ENTMCNC: 27.5 PG — SIGNIFICANT CHANGE UP (ref 27–34)
MCHC RBC-ENTMCNC: 34 GM/DL — SIGNIFICANT CHANGE UP (ref 32–36)
MCV RBC AUTO: 80.9 FL — SIGNIFICANT CHANGE UP (ref 80–100)
MONOCYTES # BLD AUTO: 0.77 K/UL — SIGNIFICANT CHANGE UP (ref 0–0.9)
MONOCYTES NFR BLD AUTO: 6.2 % — SIGNIFICANT CHANGE UP (ref 2–14)
NEUTROPHILS # BLD AUTO: 8.24 K/UL — HIGH (ref 1.8–7.4)
NEUTROPHILS NFR BLD AUTO: 66.7 % — SIGNIFICANT CHANGE UP (ref 43–77)
NRBC # BLD: 0 /100 WBCS — SIGNIFICANT CHANGE UP (ref 0–0)
PHOSPHATE SERPL-MCNC: 3.3 MG/DL — SIGNIFICANT CHANGE UP (ref 2.5–4.5)
PLATELET # BLD AUTO: 238 K/UL — SIGNIFICANT CHANGE UP (ref 150–400)
POTASSIUM SERPL-MCNC: 3.8 MMOL/L — SIGNIFICANT CHANGE UP (ref 3.5–5.3)
POTASSIUM SERPL-SCNC: 3.8 MMOL/L — SIGNIFICANT CHANGE UP (ref 3.5–5.3)
PROT SERPL-MCNC: 7.6 G/DL — SIGNIFICANT CHANGE UP (ref 6–8.3)
RBC # BLD: 4.65 M/UL — SIGNIFICANT CHANGE UP (ref 4.2–5.8)
RBC # FLD: 14.5 % — SIGNIFICANT CHANGE UP (ref 10.3–14.5)
SODIUM SERPL-SCNC: 138 MMOL/L — SIGNIFICANT CHANGE UP (ref 135–145)
TROPONIN T, HIGH SENSITIVITY RESULT: 871 NG/L — HIGH (ref 0–51)
WBC # BLD: 12.36 K/UL — HIGH (ref 3.8–10.5)
WBC # FLD AUTO: 12.36 K/UL — HIGH (ref 3.8–10.5)

## 2024-01-19 PROCEDURE — 80061 LIPID PANEL: CPT

## 2024-01-19 PROCEDURE — 82553 CREATINE MB FRACTION: CPT

## 2024-01-19 PROCEDURE — 86900 BLOOD TYPING SEROLOGIC ABO: CPT

## 2024-01-19 PROCEDURE — 36415 COLL VENOUS BLD VENIPUNCTURE: CPT

## 2024-01-19 PROCEDURE — 93005 ELECTROCARDIOGRAM TRACING: CPT

## 2024-01-19 PROCEDURE — C9606: CPT | Mod: RC

## 2024-01-19 PROCEDURE — 80053 COMPREHEN METABOLIC PANEL: CPT

## 2024-01-19 PROCEDURE — C1894: CPT

## 2024-01-19 PROCEDURE — 84100 ASSAY OF PHOSPHORUS: CPT

## 2024-01-19 PROCEDURE — 86901 BLOOD TYPING SEROLOGIC RH(D): CPT

## 2024-01-19 PROCEDURE — 83036 HEMOGLOBIN GLYCOSYLATED A1C: CPT

## 2024-01-19 PROCEDURE — 93458 L HRT ARTERY/VENTRICLE ANGIO: CPT | Mod: 59

## 2024-01-19 PROCEDURE — 99291 CRITICAL CARE FIRST HOUR: CPT | Mod: GC,25

## 2024-01-19 PROCEDURE — C1874: CPT

## 2024-01-19 PROCEDURE — C1769: CPT

## 2024-01-19 PROCEDURE — 84443 ASSAY THYROID STIM HORMONE: CPT

## 2024-01-19 PROCEDURE — 84484 ASSAY OF TROPONIN QUANT: CPT

## 2024-01-19 PROCEDURE — 82550 ASSAY OF CK (CPK): CPT

## 2024-01-19 PROCEDURE — 86850 RBC ANTIBODY SCREEN: CPT

## 2024-01-19 PROCEDURE — C1887: CPT

## 2024-01-19 PROCEDURE — 83735 ASSAY OF MAGNESIUM: CPT

## 2024-01-19 PROCEDURE — C1725: CPT

## 2024-01-19 PROCEDURE — 85025 COMPLETE CBC W/AUTO DIFF WBC: CPT

## 2024-01-19 PROCEDURE — C8929: CPT

## 2024-01-19 PROCEDURE — C9600: CPT | Mod: LD

## 2024-01-19 RX ORDER — METOPROLOL TARTRATE 50 MG
1 TABLET ORAL
Qty: 30 | Refills: 0
Start: 2024-01-19 | End: 2024-02-17

## 2024-01-19 RX ORDER — ASPIRIN/CALCIUM CARB/MAGNESIUM 324 MG
1 TABLET ORAL
Qty: 1 | Refills: 0
Start: 2024-01-19

## 2024-01-19 RX ORDER — ATORVASTATIN CALCIUM 80 MG/1
1 TABLET, FILM COATED ORAL
Qty: 1 | Refills: 0
Start: 2024-01-19

## 2024-01-19 RX ORDER — METOPROLOL TARTRATE 50 MG
1 TABLET ORAL
Qty: 60 | Refills: 0
Start: 2024-01-19 | End: 2024-02-17

## 2024-01-19 RX ORDER — LOSARTAN POTASSIUM 100 MG/1
50 TABLET, FILM COATED ORAL DAILY
Refills: 0 | Status: DISCONTINUED | OUTPATIENT
Start: 2024-01-19 | End: 2024-01-19

## 2024-01-19 RX ORDER — PANTOPRAZOLE SODIUM 20 MG/1
1 TABLET, DELAYED RELEASE ORAL
Qty: 1 | Refills: 0
Start: 2024-01-19

## 2024-01-19 RX ORDER — ATORVASTATIN CALCIUM 80 MG/1
1 TABLET, FILM COATED ORAL
Refills: 0 | DISCHARGE

## 2024-01-19 RX ORDER — METOPROLOL TARTRATE 50 MG
50 TABLET ORAL DAILY
Refills: 0 | Status: DISCONTINUED | OUTPATIENT
Start: 2024-01-19 | End: 2024-01-19

## 2024-01-19 RX ORDER — TICAGRELOR 90 MG/1
1 TABLET ORAL
Qty: 60 | Refills: 0
Start: 2024-01-19 | End: 2024-02-17

## 2024-01-19 RX ORDER — LOSARTAN POTASSIUM 100 MG/1
1 TABLET, FILM COATED ORAL
Refills: 0 | DISCHARGE

## 2024-01-19 RX ORDER — LEVOTHYROXINE SODIUM 125 MCG
1 TABLET ORAL
Refills: 0 | DISCHARGE

## 2024-01-19 RX ORDER — ASPIRIN/CALCIUM CARB/MAGNESIUM 324 MG
1 TABLET ORAL
Qty: 30 | Refills: 3
Start: 2024-01-19 | End: 2024-05-17

## 2024-01-19 RX ORDER — LOSARTAN POTASSIUM 100 MG/1
1 TABLET, FILM COATED ORAL
Qty: 30 | Refills: 0
Start: 2024-01-19 | End: 2024-02-17

## 2024-01-19 RX ORDER — ATORVASTATIN CALCIUM 80 MG/1
1 TABLET, FILM COATED ORAL
Qty: 30 | Refills: 3
Start: 2024-01-19 | End: 2024-05-17

## 2024-01-19 RX ORDER — LEVOTHYROXINE SODIUM 125 MCG
0 TABLET ORAL
Refills: 0 | DISCHARGE

## 2024-01-19 RX ORDER — PANTOPRAZOLE SODIUM 20 MG/1
1 TABLET, DELAYED RELEASE ORAL
Refills: 0 | DISCHARGE

## 2024-01-19 RX ORDER — CLOPIDOGREL BISULFATE 75 MG/1
600 TABLET, FILM COATED ORAL ONCE
Refills: 0 | Status: COMPLETED | OUTPATIENT
Start: 2024-01-19 | End: 2024-01-19

## 2024-01-19 RX ORDER — HYDROCHLOROTHIAZIDE 25 MG
1 TABLET ORAL
Refills: 0 | DISCHARGE

## 2024-01-19 RX ORDER — AMLODIPINE BESYLATE 2.5 MG/1
1 TABLET ORAL
Refills: 0 | DISCHARGE

## 2024-01-19 RX ORDER — CLOPIDOGREL BISULFATE 75 MG/1
1 TABLET, FILM COATED ORAL
Qty: 30 | Refills: 0
Start: 2024-01-19 | End: 2024-02-17

## 2024-01-19 RX ORDER — PANTOPRAZOLE SODIUM 20 MG/1
1 TABLET, DELAYED RELEASE ORAL
Qty: 30 | Refills: 0
Start: 2024-01-19 | End: 2024-02-17

## 2024-01-19 RX ORDER — CLOPIDOGREL BISULFATE 75 MG/1
300 TABLET, FILM COATED ORAL ONCE
Refills: 0 | Status: DISCONTINUED | OUTPATIENT
Start: 2024-01-19 | End: 2024-01-19

## 2024-01-19 RX ORDER — POTASSIUM CHLORIDE 20 MEQ
20 PACKET (EA) ORAL ONCE
Refills: 0 | Status: COMPLETED | OUTPATIENT
Start: 2024-01-19 | End: 2024-01-19

## 2024-01-19 RX ORDER — CLOPIDOGREL BISULFATE 75 MG/1
75 TABLET, FILM COATED ORAL DAILY
Refills: 0 | Status: DISCONTINUED | OUTPATIENT
Start: 2024-01-20 | End: 2024-01-19

## 2024-01-19 RX ORDER — LOSARTAN POTASSIUM 100 MG/1
0 TABLET, FILM COATED ORAL
Refills: 0 | DISCHARGE

## 2024-01-19 RX ADMIN — Medication 25 MILLIGRAM(S): at 05:26

## 2024-01-19 RX ADMIN — Medication 20 MILLIEQUIVALENT(S): at 05:27

## 2024-01-19 RX ADMIN — TICAGRELOR 90 MILLIGRAM(S): 90 TABLET ORAL at 05:27

## 2024-01-19 RX ADMIN — CLOPIDOGREL BISULFATE 600 MILLIGRAM(S): 75 TABLET, FILM COATED ORAL at 15:20

## 2024-01-19 RX ADMIN — Medication 75 MICROGRAM(S): at 05:28

## 2024-01-19 RX ADMIN — LOSARTAN POTASSIUM 25 MILLIGRAM(S): 100 TABLET, FILM COATED ORAL at 05:27

## 2024-01-19 RX ADMIN — Medication 30 MILLILITER(S): at 08:11

## 2024-01-19 RX ADMIN — HEPARIN SODIUM 5000 UNIT(S): 5000 INJECTION INTRAVENOUS; SUBCUTANEOUS at 05:29

## 2024-01-19 RX ADMIN — Medication 81 MILLIGRAM(S): at 11:28

## 2024-01-19 NOTE — DISCHARGE NOTE NURSING/CASE MANAGEMENT/SOCIAL WORK - NSDCFUADDAPPT_GEN_ALL_CORE_FT
APPTS ARE READY TO BE MADE: [x] YES    Best Family or Patient Contact (if needed): Spouse   Patient - Shun Pugh [phone]  Additional Information about above appointments (if needed):    1: Please schedule appt with cardiology within 1 week of discharge is possible. Patient is post MI     Other comments or requests:         Patient was provided with referral details by phone or email for a [facility] provider and will coordinate the appointment on their own.

## 2024-01-19 NOTE — DISCHARGE NOTE PROVIDER - CARE PROVIDER_API CALL
Lexus Simmons  [address]  [place] [address]  Phone: [phone]  Fax: ()-  Established Patient  Follow Up Time: 1 week   Lexus Simmons  [address]   [place] [address]  Phone: ()-  Fax: ()-  Established Patient  Follow Up Time: 1 week    Leonila Evans  Interventional Cardiology  [address] [place] [address], [address] [place] [id number]  Phone: [phone]  Fax: [phone]  Follow Up Time:

## 2024-01-19 NOTE — DISCHARGE NOTE PROVIDER - NSFOLLOWUPCLINICS_GEN_ALL_ED_FT
A Cardiologist  Cardiology  .  [place]   Phone:   Fax:   Follow Up Time: 1 week    [facility]  Cardiac Rehab  [address] [place] [address]  Phone: [phone]  Fax:   Follow Up Time: 1 week

## 2024-01-19 NOTE — DISCHARGE NOTE PROVIDER - NSDCMRMEDTOKEN_GEN_ALL_CORE_FT
amLODIPine 10 mg oral tablet: 1 tab(s) orally once a day  aspirin 81 mg oral delayed release tablet: 1 tab(s) orally once a day  atorvastatin 80 mg oral tablet: 1 tab(s) orally once a day (at bedtime)  hydroCHLOROthiazide 25 mg oral tablet: 1 tab(s) orally once a day  losartan 100 mg oral tablet: 1 tab(s) orally once a day  Protonix 40 mg oral delayed release tablet: 1 tab(s) orally once a day  Synthroid 75 mcg (0.075 mg) oral tablet: 1 tab(s) orally once a day  ticagrelor 90 mg oral tablet: 1 tab(s) orally every 12 hours   aspirin 81 mg oral delayed release tablet: 1 tab(s) orally once a day  atorvastatin 80 mg oral tablet: 1 tab(s) orally once a day (at bedtime)  clopidogrel 75 mg oral tablet: 1 tab(s) orally once a day  losartan 50 mg oral tablet: 1 tab(s) orally once a day  metoprolol succinate 50 mg oral tablet, extended release: 1 tab(s) orally once a day  Protonix 40 mg oral delayed release tablet: 1 tab(s) orally once a day  Synthroid 75 mcg (0.075 mg) oral tablet: 1 tab(s) orally once a day   aspirin 81 mg oral capsule: 1 cap(s) orally once a day  aspirin 81 mg oral delayed release tablet: 1 tab(s) orally once a day  atorvastatin 80 mg oral tablet: 1 tab(s) orally once a day (at bedtime)  atorvastatin 80 mg oral tablet: 1 tab(s) orally once a day (at bedtime)  clopidogrel 75 mg oral tablet: 1 tab(s) orally once a day  losartan 50 mg oral tablet: 1 tab(s) orally once a day  metoprolol succinate 50 mg oral tablet, extended release: 1 tab(s) orally once a day  Protonix 40 mg oral delayed release tablet: 1 tab(s) orally once a day  Protonix 40 mg oral delayed release tablet: 1 tab(s) orally once a day  Synthroid 75 mcg (0.075 mg) oral tablet: 1 tab(s) orally once a day

## 2024-01-19 NOTE — DISCHARGE NOTE PROVIDER - NSDCCPCAREPLAN_GEN_ALL_CORE_FT
PRINCIPAL DISCHARGE DIAGNOSIS  Diagnosis: STEMI (ST elevation myocardial infarction)  Assessment and Plan of Treatment: The heart, like all other organs and tissues in the body, requires a supply of blood. The blood supply to the heart is provided by blood vessels called the coronary arteries. The coronary arteries lie on the outside of the heart muscle before entering the heart muscle itself.   Myocardial infarction, or MI (commonly known as a "heart attack"), is damage or death of part of the heart muscle. The damage is caused by lack of blood flow through the coronary arteries. You underwent a minimally invasive procedure where two metal meshes called "stents" are placed in two of your blocked coronary arteries which restored blood flow through these vessels.   Please follow up with your PCP and outpatient Cardiologist within 1 week of discharge. It is also advised that you avoid engaging in strenous activity until you are evaluated outpatient by your cardiologist. You will be prescribed medications to take daily. Please inform your provider if you have any difficulty taking these medications or have questions regarding use. Please seek medical attention if you develop symptoms concerning of a heart attack including but not limited to sudden chest pain particularly if it resolves with rest or occurs during activity, shortness of breath, palpitations. perspiration, upper abdominal pain that is not characteristic of your typical indigestion discomofrt.     PRINCIPAL DISCHARGE DIAGNOSIS  Diagnosis: STEMI (ST elevation myocardial infarction)  Assessment and Plan of Treatment: The heart, like all other organs and tissues in the body, requires a supply of blood. The blood supply to the heart is provided by blood vessels called the coronary arteries. The coronary arteries lie on the outside of the heart muscle before entering the heart muscle itself.   Myocardial infarction, or MI (commonly known as a "heart attack"), is damage or death of part of the heart muscle. The damage is caused by lack of blood flow through the coronary arteries. You underwent a minimally invasive procedure where two metal meshes called "stents" are placed in two of your blocked coronary arteries which restored blood flow through these vessels.   Please follow up with your PCP and outpatient Cardiologist within 1 week of discharge. It is also advised that you avoid engaging in strenous activity until you are evaluated outpatient by your cardiologist. You will be prescribed medications to take daily. Please inform your provider if you have any difficulty taking these medications or have questions regarding use.   The following symptoms are characteristic of a heart attack and should prompt an immediate visit to the emergency room:   -Chest pain or discomfort (pressure, tightness, or squeezing)  -Pain spreading through the chest and others areas of the body, including the upper abdomen, shoulders, arms, neck and throat, or lower jaw and teeth  -Pain coming on gradually and lasting more than a few seconds  Other symptoms  -Shortness of breath  -Nausea, vomiting, or belching  -Sweating  -Palpitations (skipped heart beats)  -Lightheadedness  -Feeling tired  -Fainting

## 2024-01-19 NOTE — DISCHARGE NOTE PROVIDER - PROVIDER TOKENS
PROVIDER:[TOKEN:[[id number]],FOLLOWUP:[1 week],ESTABLISHEDPATIENT:[T]] PROVIDER:[TOKEN:[[id number]],FOLLOWUP:[1 week],ESTABLISHEDPATIENT:[T]],PROVIDER:[TOKEN:[[id number]]]

## 2024-01-19 NOTE — PROGRESS NOTE ADULT - SUBJECTIVE AND OBJECTIVE BOX
Patient is a 59y old  Male who presents with a chief complaint of STEMI (18 Jan 2024 20:35)    HPI:  59 year old male with past medical history of hypothyroidism, HTN, HLD, BPH and GERD presenting to outside hospital with chest pain found to have IWSTEMI, transferred to [facility] for emergent PCI. He is now s/p [facility] with x1 GUMARO RCA. His cath course was complicated by VT/VF requiring shock x1. On admission to CICU he is A&Ox3, and hemodynamically stable. He is stating 3/10 chest pain that is improved from symptoms prior to cath. He denies shortness of breath, abdominal pain, numbness/tingling to all extremities. (17 Jan 2024 19:50)       INTERVAL HPI/OVERNIGHT EVENTS:   No overnight events   Afebrile, hemodynamically stable     Subjective:    ICU Vital Signs Last 24 Hrs  T(C): 36.6 (19 Jan 2024 03:00), Max: 36.7 (18 Jan 2024 11:00)  T(F): 97.8 (19 Jan 2024 03:00), Max: 98.1 (18 Jan 2024 11:00)  HR: 58 (19 Jan 2024 06:00) (58 - 89)  BP: 124/74 (19 Jan 2024 06:00) (105/63 - 141/87)  BP(mean): 94 (19 Jan 2024 06:00) (78 - 109)  ABP: --  ABP(mean): --  RR: 16 (19 Jan 2024 06:00) (12 - 25)  SpO2: 98% (19 Jan 2024 06:00) (94% - 100%)    O2 Parameters below as of 19 Jan 2024 06:00  Patient On (Oxygen Delivery Method): room air          I&O's Summary    17 Jan 2024 07:01  -  18 Jan 2024 07:00  --------------------------------------------------------  IN: 490 mL / OUT: 1300 mL / NET: -810 mL    18 Jan 2024 07:01  -  19 Jan 2024 06:56  --------------------------------------------------------  IN: 350 mL / OUT: 1420 mL / NET: -1070 mL          Daily     Daily     Adult Advanced Hemodynamics Last 24 Hrs  CVP(mm Hg): --  CVP(cm H2O): --  CO: --  CI: --  PA: --  PA(mean): --  PCWP: --  SVR: --  SVRI: --  PVR: --  PVRI: --    EKG/Telemetry Events:    MEDICATIONS  (STANDING):  aspirin enteric coated 81 milliGRAM(s) Oral daily  atorvastatin 80 milliGRAM(s) Oral at bedtime  heparin   Injectable 5000 Unit(s) SubCutaneous every 12 hours  levothyroxine 75 MICROGram(s) Oral daily  losartan 25 milliGRAM(s) Oral daily  metoprolol tartrate 25 milliGRAM(s) Oral two times a day  ticagrelor 90 milliGRAM(s) Oral every 12 hours    MEDICATIONS  (PRN):  nitroglycerin     SubLingual 0.4 milliGRAM(s) SubLingual every 5 minutes PRN Chest Pain      PHYSICAL EXAM:  GENERAL:   HEAD:  Atraumatic, Normocephalic  EYES: EOMI, PERRLA, conjunctiva and sclera clear  NECK: Supple, No JVD, Normal thyroid, no enlarged nodes  NERVOUS SYSTEM:  Alert & Awake.   CHEST/LUNG: B/L good air entry; No rales, rhonchi, or wheezing  HEART: S1S2 normal, no S3, Regular rate and rhythm; No murmurs  ABDOMEN: Soft, Nontender, Nondistended; Bowel sounds present  EXTREMITIES:  2+ Peripheral Pulses, No clubbing, cyanosis, or edema  LYMPH: No lymphadenopathy noted  SKIN: No rashes or lesions    LABS:                        12.8   12.36 )-----------( 238      ( 19 Jan 2024 01:02 )             37.6     01-19    138  |  102  |  23  ----------------------------<  108<H>  3.8   |  26  |  1.11    Ca    9.2      19 Jan 2024 01:02  Phos  3.3     01-19  Mg     2.2     01-19    TPro  7.6  /  Alb  3.9  /  TBili  0.4  /  DBili  x   /  AST  91<H>  /  ALT  73<H>  /  AlkPhos  64  01-19    LIVER FUNCTIONS - ( 19 Jan 2024 01:02 )  Alb: 3.9 g/dL / Pro: 7.6 g/dL / ALK PHOS: 64 U/L / ALT: 73 U/L / AST: 91 U/L / GGT: x             CAPILLARY BLOOD GLUCOSE          CKMB Units: 38.4 ng/mL (01-19 @ 01:02)  Creatine Kinase, Serum: 515 U/L (01-19 @ 01:02)    CARDIAC MARKERS ( 19 Jan 2024 01:02 )  x     / x     / 515 U/L / x     / 38.4 ng/mL  CARDIAC MARKERS ( 18 Jan 2024 03:41 )  x     / x     / 568 U/L / x     / 67.3 ng/mL  CARDIAC MARKERS ( 17 Jan 2024 21:09 )  x     / x     / 235 U/L / x     / 13.3 ng/mL      Urinalysis Basic - ( 19 Jan 2024 01:02 )    Color: x / Appearance: x / SG: x / pH: x  Gluc: 108 mg/dL / Ketone: x  / Bili: x / Urobili: x   Blood: x / Protein: x / Nitrite: x   Leuk Esterase: x / RBC: x / WBC x   Sq Epi: x / Non Sq Epi: x / Bacteria: x          RADIOLOGY & ADDITIONAL TESTS:  CXR:        Care Discussed with Consultants/Other Providers [ x] YES  [ ] NO           Patient is a 59y old  Male who presents with a chief complaint of STEMI (18 Jan 2024 20:35)    HPI:  59 year old male with past medical history of hypothyroidism, HTN, HLD, BPH and GERD presenting to outside hospital with chest pain found to have IWSTEMI, transferred to [facility] for emergent PCI. He is now s/p [facility] with x1 GUMARO RCA. His cath course was complicated by VT/VF requiring shock x1. On admission to CICU he is A&Ox3, and hemodynamically stable. He is stating 3/10 chest pain that is improved from symptoms prior to cath. He denies shortness of breath, abdominal pain, numbness/tingling to all extremities. (17 Jan 2024 19:50)       INTERVAL HPI/OVERNIGHT EVENTS:   No overnight events   Afebrile, hemodynamically stable     Subjective: Mild "gas like" abdominal pain improved with Maalox     ICU Vital Signs Last 24 Hrs  T(C): 36.6 (19 Jan 2024 03:00), Max: 36.7 (18 Jan 2024 11:00)  T(F): 97.8 (19 Jan 2024 03:00), Max: 98.1 (18 Jan 2024 11:00)  HR: 58 (19 Jan 2024 06:00) (58 - 89)  BP: 124/74 (19 Jan 2024 06:00) (105/63 - 141/87)  BP(mean): 94 (19 Jan 2024 06:00) (78 - 109)  ABP: --  ABP(mean): --  RR: 16 (19 Jan 2024 06:00) (12 - 25)  SpO2: 98% (19 Jan 2024 06:00) (94% - 100%)    O2 Parameters below as of 19 Jan 2024 06:00  Patient On (Oxygen Delivery Method): room air          I&O's Summary    17 Jan 2024 07:01  -  18 Jan 2024 07:00  --------------------------------------------------------  IN: 490 mL / OUT: 1300 mL / NET: -810 mL    18 Jan 2024 07:01  -  19 Jan 2024 06:56  --------------------------------------------------------  IN: 350 mL / OUT: 1420 mL / NET: -1070 mL          Daily     Daily     Adult Advanced Hemodynamics Last 24 Hrs  CVP(mm Hg): --  CVP(cm H2O): --  CO: --  CI: --  PA: --  PA(mean): --  PCWP: --  SVR: --  SVRI: --  PVR: --  PVRI: --    EKG/Telemetry Events:    MEDICATIONS  (STANDING):  aspirin enteric coated 81 milliGRAM(s) Oral daily  atorvastatin 80 milliGRAM(s) Oral at bedtime  heparin   Injectable 5000 Unit(s) SubCutaneous every 12 hours  levothyroxine 75 MICROGram(s) Oral daily  losartan 25 milliGRAM(s) Oral daily  metoprolol tartrate 25 milliGRAM(s) Oral two times a day  ticagrelor 90 milliGRAM(s) Oral every 12 hours    MEDICATIONS  (PRN):  nitroglycerin     SubLingual 0.4 milliGRAM(s) SubLingual every 5 minutes PRN Chest Pain      PHYSICAL EXAM:  GENERAL:   HEAD:  Atraumatic, Normocephalic  EYES: EOMI, PERRLA, conjunctiva and sclera clear  NECK: Supple, No JVD, Normal thyroid, no enlarged nodes  NERVOUS SYSTEM:  Alert & Awake.   CHEST/LUNG: B/L good air entry; No rales, rhonchi, or wheezing  HEART: S1S2 normal, no S3, Regular rate and rhythm; No murmurs. Nonreproducible CP.   ABDOMEN: Soft, Nontender, Nondistended; Bowel sounds present  EXTREMITIES:  2+ Peripheral Pulses, No clubbing, cyanosis, or edema. R radial access site well healed.   LYMPH: No lymphadenopathy noted  SKIN: No rashes or lesions    LABS:                        12.8   12.36 )-----------( 238      ( 19 Jan 2024 01:02 )             37.6     01-19    138  |  102  |  23  ----------------------------<  108<H>  3.8   |  26  |  1.11    Ca    9.2      19 Jan 2024 01:02  Phos  3.3     01-19  Mg     2.2     01-19    TPro  7.6  /  Alb  3.9  /  TBili  0.4  /  DBili  x   /  AST  91<H>  /  ALT  73<H>  /  AlkPhos  64  01-19    LIVER FUNCTIONS - ( 19 Jan 2024 01:02 )  Alb: 3.9 g/dL / Pro: 7.6 g/dL / ALK PHOS: 64 U/L / ALT: 73 U/L / AST: 91 U/L / GGT: x             CAPILLARY BLOOD GLUCOSE          CKMB Units: 38.4 ng/mL (01-19 @ 01:02)  Creatine Kinase, Serum: 515 U/L (01-19 @ 01:02)    CARDIAC MARKERS ( 19 Jan 2024 01:02 )  x     / x     / 515 U/L / x     / 38.4 ng/mL  CARDIAC MARKERS ( 18 Jan 2024 03:41 )  x     / x     / 568 U/L / x     / 67.3 ng/mL  CARDIAC MARKERS ( 17 Jan 2024 21:09 )  x     / x     / 235 U/L / x     / 13.3 ng/mL      Urinalysis Basic - ( 19 Jan 2024 01:02 )    Color: x / Appearance: x / SG: x / pH: x  Gluc: 108 mg/dL / Ketone: x  / Bili: x / Urobili: x   Blood: x / Protein: x / Nitrite: x   Leuk Esterase: x / RBC: x / WBC x   Sq Epi: x / Non Sq Epi: x / Bacteria: x          RADIOLOGY & ADDITIONAL TESTS:  CXR:  TTE 1/18: LVEF 39% with RMWA. Hypokinetic inf wall and mid inferior septum.    EKG 1/17: VIRGILIO inferior leads   EKG 1/18: TWI inferior/septal leads   Care Discussed with Consultants/Other Providers [ x] YES  [ ] NO

## 2024-01-19 NOTE — DISCHARGE NOTE PROVIDER - HOSPITAL COURSE
59 year old male with past medical history of hypothyroidism, HTN, HLD, BPH and GERD presented to [facility] with chest pain found to have IWSTEMI, transferred to [facility] for emergent PCI. He is now s/p [facility] with x1 GUMARO RCA. His cath course was complicated by VT/VF requiring shock x1. A transthoracic echo was performed showing a newly reduced ejection fraction of from 69% (10/2023- [facility] cardiology) to 39% with regional wall motion abnormalities. He was admitted to the CCU on 1/18. He underwent a staged PCI 1/18 with stent placement in the proximal LAD. The procedure was well tolerated without complications. On 1/19 patient was deemed stable for discharge with close followup with outpatient Cardiology and PMD, patient expressed verbal understanding of the followup plan and was discharged home with medications.     Important Medication Changes and Reason:   New Medications - Lopressor 25mg qd, Aspirin 81mg PO qd, Brillinta 90mg PO qd, Lipitor 80mg qhs   Medication changes- Losartan 100mg qd -> 50mg qd     Active or Pending Issues Requiring Follow-up: Followup with Cardiology, PMD within 1 week of discharge. Repeat Echo within 3 months.     Advanced Directives:   [X] Full code  [ ] DNR  [ ] Hospice    Discharge Diagnoses: ST elevation Myocardial Infarction            59 year old male with past medical history of hypothyroidism, HTN, HLD, BPH and GERD presented to [facility] with chest pain found to have IWSTEMI, transferred to [facility] for emergent PCI. He is now s/p [facility] with x1 GUMARO RCA. His cath course was complicated by VT/VF requiring shock x1. A transthoracic echo was performed showing a newly reduced ejection fraction of from 69% (10/2023- [facility] cardiology) to 39% with regional wall motion abnormalities. He was admitted to the CCU on 1/18. He underwent a staged PCI 1/18 with stent placement in the proximal LAD. The procedure was well tolerated without complications. On 1/19 patient was deemed stable for discharge with close followup with outpatient Cardiology and PMD, patient expressed verbal understanding of the followup plan and was discharged home with medications.     Important Medication Changes and Reason:   New Medications - Lopressor 25mg qd, Aspirin 81mg PO qd, Plavix 75mg PO qd, Lipitor 80mg qhs   Medication changes- Losartan 100mg qd -> 50mg qd     Active or Pending Issues Requiring Follow-up: Followup with Cardiology, PMD within 1 week of discharge. Repeat Echo within 3 months.     Advanced Directives:   [X] Full code  [ ] DNR  [ ] Hospice    Discharge Diagnoses: ST elevation Myocardial Infarction

## 2024-01-19 NOTE — PROGRESS NOTE ADULT - ATTENDING COMMENTS
59M w/ ISWTEMI s/p PCI to RCA pending staged PCI to LAD  Neuro: No active issues, a/ox3  Resp: Stable on RA  CVS: c/w asa/brilinta/lipitor. Tolerating metoprolol. Transition from hydralazine to Losartan.  GI: DASH diet, bowel regimen PRN  Renal: Monitor UOP, trend electrolytes  ID: Monitor off abx  Endo: Monitor FS, clarify synthroid dose  Heme: HSQ after [facility] today  No cental access
59M w/ ISWTEMI s/p PCI to RCA and staged PCI to LAD    Neuro: No active issues, a/ox3  Resp: Stable on RA  CVS: c/w asa/brilinta/lipitor. Tolerating metoprolol and losartan   GI: DASH diet, bowel regimen PRN  Renal: Monitor UOP, trend electrolytes  ID: Monitor off abx  Endo: Monitor FS, c/w synthroid   Heme: HSQ   No cental access.

## 2024-01-19 NOTE — DISCHARGE NOTE NURSING/CASE MANAGEMENT/SOCIAL WORK - PATIENT PORTAL LINK FT
You can access the FollowMyHealth Patient Portal offered by [facility] by registering at the following website: [email or web address]. By joining [facility]’s FollowMyHealth portal, you will also be able to view your health information using other applications (apps) compatible with our system.

## 2024-01-19 NOTE — DISCHARGE NOTE PROVIDER - NSDCFUADDAPPT_GEN_ALL_CORE_FT
APPTS ARE READY TO BE MADE: [x] YES    Best Family or Patient Contact (if needed): Spouse   Patient - Shun Pugh [phone]  Additional Information about above appointments (if needed):    1: Please schedule appt with cardiology within 1 week of discharge is possible. Patient is post MI     Other comments or requests:    APPTS ARE READY TO BE MADE: [x] YES    Best Family or Patient Contact (if needed): Spouse   Patient - Shun Pugh [phone]  Additional Information about above appointments (if needed):    1: Please schedule appt with cardiology within 1 week of discharge is possible. Patient is post MI     Other comments or requests:         Patient was provided with referral details by phone or email for a [facility] provider and will coordinate the appointment on their own.   APPTS ARE READY TO BE MADE: [x] YES    Best Family or Patient Contact (if needed): Spouse   Patient - Shun Pugh [phone]  Additional Information about above appointments (if needed):    1: Please schedule appt with cardiology within 1 week of discharge is possible. Patient is post MI     Other comments or requests:     Appointment was scheduled by our team on the patient's behalf through the provider's office. Patient is scheduled with Dr. Paul 2/6 2:00pm [address] [place] [address]    Cardiology and Internal Medicine at [place] office for cardiac rehab was contacted to secure an appointment, however the office will follow up with the patient/caregiver directly.     Patient was provided with referral details by phone or email for a [facility] provider and will coordinate the appointment on their own for Dr. Sethi.

## 2024-01-19 NOTE — DISCHARGE NOTE PROVIDER - NSDCCAREPROVSEEN_GEN_ALL_CORE_FT
Dr. Theo Chaparro    Cardiac Intensive Care Unit [facility]   Attending Physician Dr. Theo Chaparro [phone] [address] [place] [address], [address] [place] [address]

## 2024-01-19 NOTE — DISCHARGE NOTE PROVIDER - NSDCCPTREATMENT_GEN_ALL_CORE_FT
PRINCIPAL PROCEDURE  Procedure: Left heart cardiac cath  Findings and Treatment: Initial LHC showed 70% occlusion of the LAD and 100% oclusion of the RCA which was stented. A staged PCI was performed 2 days later, a stent was deployed into the proximal LAD.      SECONDARY PROCEDURE  Procedure: Transthoracic echocardiography (TTE)  Findings and Treatment: 1. Left ventricular cavity is normal. Left ventricular wall thickness is normal. Left ventricular systolic function is moderately decreased with an ejection fraction of 39 % by Estrada's method of disks. Regional wall motion abnormalities present.   2.There is no evidence of a left ventricular thrombus.   3. There is mild (grade 1) left ventricular diastolic dysfunction, with normal filling pressure.   4. Entire inferior wall and basal and mid inferior septum are abnormal.   5. Normal right ventricular cavity size and normal systolic function.   6. Normal left and right atrial size.   7. No prior echocardiogram is available for comparison.< from: TTE W or WO Ultrasound Enhancing Agent (01.18.24 @ 06:48) >      Procedure: EKG performed  Findings and Treatment: 1/17: ST elevations in inferior leads

## 2024-01-19 NOTE — DISCHARGE NOTE NURSING/CASE MANAGEMENT/SOCIAL WORK - NSDCPEFALRISK_GEN_ALL_CORE
For information on Fall & Injury Prevention, visit: [email or web address] OR  [email or web address] OR  https://www.cdc.gov/steadi/patient.html

## 2024-01-19 NOTE — DISCHARGE NOTE PROVIDER - NSDCFUADDINST_GEN_ALL_CORE_FT
Please engage only in non strenuous activities until you are evaluated outpatient by cardiology  Below is information regarding our cardiac rehabilitation center that can help you transition to regular physical activity.   [facility] Cardiac Testing at [place]- [address], [place] [address] Ph: [phone]   Please engage only in non strenuous activities until you are evaluated outpatient by cardiology  Below is information regarding our cardiac rehabilitation center that can help you transition to regular physical activity.   [facility] Cardiac Testing at [place]- [address] [place] [address] [address], [place] [address] Ph: [phone]    Continue your medications. Do not stop Aspirin or Plavix unless instructed by your cardiologist.  No heavy lifting or pushing/pulling or strenuous activity with procedure arm for 2 weeks. No driving for 2 days. No sex for 1 week.  You may shower 24 hours following procedure but no soaking of your wrist in water (such as in a pool, sink, or tub) for 1 week. Check wrist site for bleeding and/or swelling daily following procedure. Call your doctor/cardiologist immediately for bleeding or swelling or if you have increased/persistent pain or drainage at the wrist site or if you have numbness, tingling or blue or white coloring of your hand or fingers.  Follow up with your cardiologist in 1- 2 weeks. You may call [facility] at [phone] or [phone] after office hours and weekends with any questions or concerns following your procedure.

## 2024-01-19 NOTE — DISCHARGE NOTE PROVIDER - NSDCFUSCHEDAPPT_GEN_ALL_CORE_FT
Claire Paul  [facility] Physician [facility]  CARDIOLOGY [address]  Scheduled Appointment: 02/06/2024

## 2024-01-19 NOTE — DISCHARGE NOTE PROVIDER - CARE PROVIDERS DIRECT ADDRESSES
[email or web address]

## 2024-01-19 NOTE — PROGRESS NOTE ADULT - ASSESSMENT
59 year old male with past medical history of hypothyroidism, HTN, HLD, BPH and GERD presenting to outside hospital with chest pain found to have IWSTEMI, transferred to [facility] for emergent PCI, now s/p LHC with x1 GUMARO RCA c/b by VT/VF requiring shock x1 and staged PCI with GUMARO x 1 pLAD.     Neuro  - A&O x3  - continue to monitor mental status    Respiratory  - saturating well on room air  - continue to monitor sp02    Cardiovascular  # IWSTEMI  - s/p LHC x1 GUMARO RCA  - PCI to pLAD   - DAPT: c/w asa and brilinta   - high intensity statin  - GDMT: metoprolol BID, Losartan 25   - trend enzymes to peak   - f/u echo    HTN  - antihypertensives as above    Renal  - f/u sCr  - continue strict I&O, electrolyte monitoring    GI:  - dash diet  - continue to monitor for BM    Endo  #hypothyroidism  - on synthroid at home, continue (will clarify dose in AM)   - A1C 5.7  #HLD  - Cholesterol 258, , HDL 37,   - c/w Lipitor 80     Heme  - f/u cbc, no s/sx  - continue to trend daily  DVT ppx: heparin subq     ID  - afebrile on admission  - continue to trend wbc and fever curve  - leukocytosis likely reactive, downtrending     ======================= DISPOSITION  =====================  [X] Critical   [ ] Guarded    [ ] Stable    [X] Maintain in CICU  [ ] Downgrade to Telemtry  [ ] Discharge Home 59 year old male with past medical history of hypothyroidism, HTN, HLD, BPH and GERD presenting to outside hospital with chest pain found to have IWSTEMI, transferred to [facility] for emergent PCI, now s/p LHC with x1 GUMARO RCA c/b by VT/VF requiring shock x1 and staged PCI with GUMARO x 1 pLAD.     Neuro  - A&O x3  - continue to monitor mental status    Respiratory  - saturating well on room air  - continue to monitor sp02    Cardiovascular  # IWSTEMI  - s/p LHC x1 GUMARO RCA  - PCI to pLAD   - DAPT: c/w asa and brilinta   - high intensity statin  - GDMT: metoprolol BID, Losartan 25   - trend enzymes to peak   - TTE LVEF 39% with RMWA. Hypokinetic inf wall and mid inferior septum      HTN  - antihypertensives as above    Renal  - f/u sCr  - continue strict I&O, electrolyte monitoring    GI:  - dash diet  - continue to monitor for BM    Endo  #hypothyroidism  - on synthroid at home, c/w 75mcg qd   - A1C 5.7  #HLD  - Cholesterol 258, , HDL 37,   - c/w Lipitor 80     Heme  - f/u cbc, no s/sx  - continue to trend daily  DVT ppx: heparin subq     ID  - afebrile on admission  - continue to trend wbc and fever curve  - leukocytosis likely reactive, downtrending     ======================= DISPOSITION  =====================  [ ] Critical   [ ] Guarded    [x] Stable    [ ] Maintain in CICU  [ ] Downgrade to Telemtry  [x] Discharge Home 59 year old male with past medical history of hypothyroidism, HTN, HLD, BPH and GERD presenting to outside hospital with chest pain found to have IWSTEMI, transferred to [facility] for emergent PCI, now s/p LHC with x1 GUMARO RCA c/b by VT/VF requiring shock x1 and staged PCI with GUMARO x 1 pLAD.     Neuro  - A&O x3  - continue to monitor mental status    Respiratory  - saturating well on room air  - continue to monitor sp02    Cardiovascular  # IWSTEMI  - s/p LHC x1 GUMARO RCA  - PCI to pLAD   - DAPT: c/w asa and brilinta   - high intensity statin  - GDMT: MTPs 50qd, Losartan 50qd  - TTE LVEF 39% with RMWA. Hypokinetic inf wall and mid inferior septum      HTN  - antihypertensives as above    Renal  - f/u sCr  - continue strict I&O, electrolyte monitoring    GI:  - dash diet  - continue to monitor for BM    Endo  #hypothyroidism  - on synthroid at home, c/w 75mcg qd   - A1C 5.7  #HLD  - Cholesterol 258, , HDL 37,   - c/w Lipitor 80     Heme  - f/u cbc, no s/sx  - continue to trend daily  DVT ppx: heparin subq     ID  - afebrile on admission  - continue to trend wbc and fever curve  - leukocytosis likely reactive, downtrending     ======================= DISPOSITION  =====================  [ ] Critical   [ ] Guarded    [x] Stable    [ ] Maintain in CICU  [ ] Downgrade to Telemtry  [x] Discharge Home

## 2024-01-24 ENCOUNTER — NON-APPOINTMENT (OUTPATIENT)
Age: 59
End: 2024-01-24

## 2024-01-24 PROBLEM — K21.9 GASTRO-ESOPHAGEAL REFLUX DISEASE WITHOUT ESOPHAGITIS: Chronic | Status: ACTIVE | Noted: 2024-01-17

## 2024-01-24 PROBLEM — Z00.00 ENCOUNTER FOR PREVENTIVE HEALTH EXAMINATION: Status: ACTIVE | Noted: 2024-01-24

## 2024-01-24 PROBLEM — E03.9 HYPOTHYROIDISM, UNSPECIFIED: Chronic | Status: ACTIVE | Noted: 2024-01-17

## 2024-01-24 PROBLEM — E78.5 HYPERLIPIDEMIA, UNSPECIFIED: Chronic | Status: ACTIVE | Noted: 2024-01-17

## 2024-01-24 PROBLEM — I10 ESSENTIAL (PRIMARY) HYPERTENSION: Chronic | Status: ACTIVE | Noted: 2024-01-17

## 2024-02-06 ENCOUNTER — APPOINTMENT (OUTPATIENT)
Dept: CARDIOLOGY | Facility: CLINIC | Age: 59
End: 2024-02-06
Payer: COMMERCIAL

## 2024-02-06 ENCOUNTER — NON-APPOINTMENT (OUTPATIENT)
Age: 59
End: 2024-02-06

## 2024-02-06 VITALS — SYSTOLIC BLOOD PRESSURE: 212 MMHG | DIASTOLIC BLOOD PRESSURE: 107 MMHG

## 2024-02-06 VITALS
SYSTOLIC BLOOD PRESSURE: 207 MMHG | WEIGHT: 162 LBS | BODY MASS INDEX: 26.99 KG/M2 | HEIGHT: 65 IN | OXYGEN SATURATION: 99 % | HEART RATE: 59 BPM | DIASTOLIC BLOOD PRESSURE: 115 MMHG

## 2024-02-06 DIAGNOSIS — I22.1 SUBSEQUENT ST ELEVATION (STEMI) MYOCARDIAL INFARCTION OF INFERIOR WALL: ICD-10-CM

## 2024-02-06 DIAGNOSIS — N40.0 BENIGN PROSTATIC HYPERPLASIA WITHOUT LOWER URINARY TRACT SYMPMS: ICD-10-CM

## 2024-02-06 DIAGNOSIS — K21.9 GASTRO-ESOPHAGEAL REFLUX DISEASE W/OUT ESOPHAGITIS: ICD-10-CM

## 2024-02-06 DIAGNOSIS — I25.118 ATHEROSCLEROTIC HEART DISEASE OF NATIVE CORONARY ARTERY WITH OTHER FORMS OF ANGINA PECTORIS: ICD-10-CM

## 2024-02-06 DIAGNOSIS — I10 ESSENTIAL (PRIMARY) HYPERTENSION: ICD-10-CM

## 2024-02-06 DIAGNOSIS — E78.5 HYPERLIPIDEMIA, UNSPECIFIED: ICD-10-CM

## 2024-02-06 DIAGNOSIS — E03.9 HYPOTHYROIDISM, UNSPECIFIED: ICD-10-CM

## 2024-02-06 PROCEDURE — 99214 OFFICE O/P EST MOD 30 MIN: CPT | Mod: 25

## 2024-02-06 PROCEDURE — 93000 ELECTROCARDIOGRAM COMPLETE: CPT

## 2024-02-06 RX ORDER — AMLODIPINE BESYLATE 10 MG/1
10 TABLET ORAL
Qty: 90 | Refills: 3 | Status: ACTIVE | COMMUNITY
Start: 2024-02-06 | End: 1900-01-01

## 2024-02-06 RX ORDER — CLOPIDOGREL BISULFATE 75 MG/1
75 TABLET, FILM COATED ORAL
Qty: 30 | Refills: 2 | Status: ACTIVE | COMMUNITY

## 2024-02-06 RX ORDER — ATORVASTATIN CALCIUM 80 MG/1
80 TABLET, FILM COATED ORAL
Qty: 90 | Refills: 1 | Status: ACTIVE | COMMUNITY

## 2024-02-06 RX ORDER — LEVOTHYROXINE SODIUM 0.07 MG/1
75 TABLET ORAL
Qty: 90 | Refills: 0 | Status: ACTIVE | COMMUNITY

## 2024-02-06 RX ORDER — METOPROLOL SUCCINATE 50 MG/1
50 TABLET, EXTENDED RELEASE ORAL DAILY
Qty: 90 | Refills: 1 | Status: ACTIVE | COMMUNITY

## 2024-02-06 RX ORDER — PANTOPRAZOLE SODIUM 40 MG/1
40 TABLET, DELAYED RELEASE ORAL
Refills: 0 | Status: ACTIVE | COMMUNITY

## 2024-02-06 RX ORDER — LOSARTAN POTASSIUM 50 MG/1
50 TABLET, FILM COATED ORAL DAILY
Qty: 90 | Refills: 1 | Status: ACTIVE | COMMUNITY

## 2024-02-06 RX ORDER — ASPIRIN ENTERIC COATED TABLETS 81 MG 81 MG/1
81 TABLET, DELAYED RELEASE ORAL
Qty: 90 | Refills: 1 | Status: ACTIVE | COMMUNITY

## 2024-02-27 ENCOUNTER — APPOINTMENT (OUTPATIENT)
Dept: CARDIOLOGY | Facility: CLINIC | Age: 59
End: 2024-02-27

## 2024-03-26 PROBLEM — I25.118 CORONARY ARTERY DISEASE WITH OTHER FORM OF ANGINA PECTORIS: Status: ACTIVE | Noted: 2024-02-06

## 2024-03-26 PROBLEM — I10 HYPERTENSION, UNSPECIFIED TYPE: Status: ACTIVE | Noted: 2024-02-06

## 2024-03-26 PROBLEM — E78.5 HYPERLIPIDEMIA: Status: ACTIVE | Noted: 2024-02-06

## 2024-03-26 NOTE — HISTORY OF PRESENT ILLNESS
[FreeTextEntry1] : Recent inf STEMI s/p RCA stent and staged PCI to LAD Returns for f/u Feels well No CP No bleeding Compliant with meds

## 2024-03-26 NOTE — CARDIOLOGY SUMMARY
[de-identified] : 2/6/24 Sinus Bradycardia  Voltage criteria for LVH (R(I)+S(III) exceeds 2.50 mV) -Voltage criteria w/o ST/T abnormality may be normal. -Anterior infarct -age undetermined. -Extensive T-abnormality -Anterolateral and inferior ischemia.  ABNORMAL

## 2024-03-26 NOTE — DISCUSSION/SUMMARY
[FreeTextEntry1] : STEMI - pRCA PCI and staged LAD Feels well No changes to meds RTO 1-2 mo for med titration [EKG obtained to assist in diagnosis and management of assessed problem(s)] : EKG obtained to assist in diagnosis and management of assessed problem(s)

## 2024-06-25 ENCOUNTER — NON-APPOINTMENT (OUTPATIENT)
Age: 59
End: 2024-06-25

## 2024-06-25 ENCOUNTER — APPOINTMENT (OUTPATIENT)
Dept: CARDIOLOGY | Facility: CLINIC | Age: 59
End: 2024-06-25

## 2024-06-25 VITALS
HEART RATE: 58 BPM | OXYGEN SATURATION: 99 % | DIASTOLIC BLOOD PRESSURE: 76 MMHG | BODY MASS INDEX: 26.63 KG/M2 | SYSTOLIC BLOOD PRESSURE: 172 MMHG | WEIGHT: 160 LBS

## 2024-06-25 PROCEDURE — 99214 OFFICE O/P EST MOD 30 MIN: CPT | Mod: 25

## 2024-06-25 PROCEDURE — 93000 ELECTROCARDIOGRAM COMPLETE: CPT

## 2024-12-17 ENCOUNTER — APPOINTMENT (OUTPATIENT)
Dept: CARDIOLOGY | Facility: CLINIC | Age: 59
End: 2024-12-17